# Patient Record
Sex: FEMALE | Race: WHITE | Employment: FULL TIME | URBAN - METROPOLITAN AREA
[De-identification: names, ages, dates, MRNs, and addresses within clinical notes are randomized per-mention and may not be internally consistent; named-entity substitution may affect disease eponyms.]

---

## 2017-01-17 ENCOUNTER — OFFICE VISIT (OUTPATIENT)
Dept: FAMILY MEDICINE CLINIC | Facility: CLINIC | Age: 47
End: 2017-01-17

## 2017-01-17 VITALS
HEART RATE: 71 BPM | OXYGEN SATURATION: 98 % | RESPIRATION RATE: 20 BRPM | HEIGHT: 62 IN | DIASTOLIC BLOOD PRESSURE: 86 MMHG | TEMPERATURE: 98 F | WEIGHT: 137 LBS | SYSTOLIC BLOOD PRESSURE: 126 MMHG | BODY MASS INDEX: 25.21 KG/M2

## 2017-01-17 DIAGNOSIS — G89.29 CHRONIC LEFT-SIDED LOW BACK PAIN WITHOUT SCIATICA: Primary | ICD-10-CM

## 2017-01-17 DIAGNOSIS — M54.50 CHRONIC LEFT-SIDED LOW BACK PAIN WITHOUT SCIATICA: Primary | ICD-10-CM

## 2017-01-17 PROCEDURE — 99213 OFFICE O/P EST LOW 20 MIN: CPT | Performed by: FAMILY MEDICINE

## 2017-01-18 NOTE — PATIENT INSTRUCTIONS
Treating Insomnia  Good sleeping habits are a key part of treatment. If needed, some medications may help you sleep better at first. Making healthy lifestyle changes and learning to relax can improve your sleep.  Treating insomnia takes commitment, but tr · Deep breathing. Sit or lie back in a chair. Take a slow, deep breath. Hold it for 5 counts. Then breathe out slowly through your mouth. Keep doing this until you feel relaxed. · Progressive muscle relaxation.  Tense and then relax the muscles in your bod Visualization  Visualization is like taking a mental vacation. It frees your mind while keeping your body in a calm state. To get started, picture yourself feeling warm and relaxed. Choose a peaceful setting that appeals to you and fill in the details.  If

## 2017-01-18 NOTE — PROGRESS NOTES
HPI:    Patient ID: Saira Campos is a 40year old female. Low Back Pain  This is a chronic problem. Episode onset: 1 yr ago. The problem occurs constantly. The problem is unchanged. The pain is present in the lumbar spine.  The quality of the pain is diagnosis)   Follow up if not resolving in 4-6 weeks. 1. Chronic left-sided low back pain without sciatica  - Physical Therapy Referral - External    No orders of the defined types were placed in this encounter.        Meds This Visit:  No prescriptions

## 2017-01-30 ENCOUNTER — MED REC SCAN ONLY (OUTPATIENT)
Dept: FAMILY MEDICINE CLINIC | Facility: CLINIC | Age: 47
End: 2017-01-30

## 2017-03-06 ENCOUNTER — TELEPHONE (OUTPATIENT)
Dept: SURGERY | Facility: CLINIC | Age: 47
End: 2017-03-06

## 2017-03-06 DIAGNOSIS — D43.2: Primary | ICD-10-CM

## 2017-03-07 ENCOUNTER — TELEPHONE (OUTPATIENT)
Dept: NEUROLOGY | Facility: CLINIC | Age: 47
End: 2017-03-07

## 2017-03-07 NOTE — TELEPHONE ENCOUNTER
Started PA [facility]IM online for MRI Brain cpt code 39088  Approved.  Order# [id number]  Valid 03/07/17 to 04/05/17    Pt not scheduled at this time for test. Contacted pt and LMOM advising of response to contact central scheduling to make appt

## 2017-03-19 ENCOUNTER — HOSPITAL ENCOUNTER (OUTPATIENT)
Dept: MRI IMAGING | Age: 47
Discharge: HOME | End: 2017-03-19
Attending: PHYSICIAN ASSISTANT
Payer: COMMERCIAL

## 2017-03-19 ENCOUNTER — APPOINTMENT (OUTPATIENT)
Dept: MRI IMAGING | Age: 47
End: 2017-03-19
Attending: NEUROLOGICAL SURGERY
Payer: COMMERCIAL

## 2017-03-19 DIAGNOSIS — M43.00 PARS LYSIS: ICD-10-CM

## 2017-03-19 DIAGNOSIS — M54.9 BACK PAIN, UNSPECIFIED BACK LOCATION, UNSPECIFIED BACK PAIN LATERALITY, UNSPECIFIED CHRONICITY: ICD-10-CM

## 2017-03-19 PROCEDURE — 72148 MRI LUMBAR SPINE W/O DYE: CPT

## 2017-04-01 ENCOUNTER — HOSPITAL ENCOUNTER (OUTPATIENT)
Dept: MRI IMAGING | Age: 47
Discharge: HOME | End: 2017-04-01
Attending: NEUROLOGICAL SURGERY
Payer: COMMERCIAL

## 2017-04-01 DIAGNOSIS — D43.2: ICD-10-CM

## 2017-04-01 PROCEDURE — A9575 INJ GADOTERATE MEGLUMI 0.1ML: HCPCS | Performed by: NEUROLOGICAL SURGERY

## 2017-04-01 PROCEDURE — 70553 MRI BRAIN STEM W/O & W/DYE: CPT

## 2017-04-04 ENCOUNTER — MED REC SCAN ONLY (OUTPATIENT)
Dept: FAMILY MEDICINE CLINIC | Facility: CLINIC | Age: 47
End: 2017-04-04

## 2017-04-13 ENCOUNTER — OFFICE VISIT (OUTPATIENT)
Dept: SURGERY | Facility: CLINIC | Age: 47
End: 2017-04-13

## 2017-04-13 VITALS
HEART RATE: 72 BPM | SYSTOLIC BLOOD PRESSURE: 104 MMHG | BODY MASS INDEX: 24.29 KG/M2 | WEIGHT: 132 LBS | DIASTOLIC BLOOD PRESSURE: 72 MMHG | RESPIRATION RATE: 12 BRPM | HEIGHT: 62 IN

## 2017-04-13 DIAGNOSIS — D43.2: Primary | ICD-10-CM

## 2017-04-13 PROCEDURE — 99212 OFFICE O/P EST SF 10 MIN: CPT | Performed by: PHYSICIAN ASSISTANT

## 2017-04-13 NOTE — PATIENT INSTRUCTIONS
Refill policies:    • Allow 2 business days for refills; controlled substances may take longer.   • Contact your pharmacy at least 5 days prior to running out of medication and have them send an electronic request or submit request through the “request re insurance carrier to obtain pre-certification or prior authorization. Unfortunately, [facility] has seen an increase in denial of payment even though the procedure/test has been pre-certified.   You are strongly encouraged to contact your insurance carrier to v

## 2017-04-13 NOTE — PROGRESS NOTES
Neurosurgery Clinic Visit  2017    Faizan Owens PCP:  DO ELENO Meza 1970 MRN [id number]       CC: f/u craniotomy    HPI:    Patient here for f/u after left parietal craniotomy for ganglioglioma resection on 4/15/2015 with Dr. Ct Miller. is alert and orientated x 3.  Speech fluent. Comprehension intact. Pupils equally round and reactive to light.  3+ brisk bilaterally.  EOMs intact.  Visual fields are full.  Face is symmetrical. Shrug shoulders normally bilaterally.  Cranial nerves II-XII a

## 2017-05-20 ENCOUNTER — HOSPITAL ENCOUNTER (OUTPATIENT)
Dept: MAMMOGRAPHY | Age: 47
Discharge: HOME | End: 2017-05-20
Attending: OBSTETRICS & GYNECOLOGY
Payer: COMMERCIAL

## 2017-05-20 DIAGNOSIS — Z12.31 VISIT FOR SCREENING MAMMOGRAM: ICD-10-CM

## 2017-05-20 PROCEDURE — 77067 SCR MAMMO BI INCL CAD: CPT | Performed by: OBSTETRICS & GYNECOLOGY

## 2017-06-09 ENCOUNTER — HOSPITAL ENCOUNTER (OUTPATIENT)
Dept: MAMMOGRAPHY | Facility: HOSPITAL | Age: 47
Discharge: HOME | End: 2017-06-09
Attending: OBSTETRICS & GYNECOLOGY
Payer: COMMERCIAL

## 2017-06-09 DIAGNOSIS — R92.2 INCONCLUSIVE MAMMOGRAM: ICD-10-CM

## 2017-06-09 PROCEDURE — 77061 BREAST TOMOSYNTHESIS UNI: CPT | Performed by: OBSTETRICS & GYNECOLOGY

## 2017-06-09 PROCEDURE — 76641 ULTRASOUND BREAST COMPLETE: CPT | Performed by: OBSTETRICS & GYNECOLOGY

## 2017-06-09 PROCEDURE — 77065 DX MAMMO INCL CAD UNI: CPT | Performed by: OBSTETRICS & GYNECOLOGY

## 2017-06-09 NOTE — IMAGING NOTE
Asssisted Dr. Patrice Amaral with recommendation for a Right breast biopsy for nodule. Emotional and educational support provided. Written information provided to [employer].  Our breast center schedulers will call pt within 72 hours to schedule an appointme

## 2017-06-16 ENCOUNTER — HOSPITAL ENCOUNTER (OUTPATIENT)
Dept: MAMMOGRAPHY | Age: 47
Discharge: HOME | End: 2017-06-16
Attending: OBSTETRICS & GYNECOLOGY
Payer: COMMERCIAL

## 2017-06-16 ENCOUNTER — HOSPITAL ENCOUNTER (OUTPATIENT)
Dept: ULTRASOUND IMAGING | Age: 47
Discharge: HOME | End: 2017-06-16
Attending: OBSTETRICS & GYNECOLOGY
Payer: COMMERCIAL

## 2017-06-16 DIAGNOSIS — N63.0 BREAST MASS: ICD-10-CM

## 2017-06-16 PROCEDURE — 77065 DX MAMMO INCL CAD UNI: CPT | Performed by: OBSTETRICS & GYNECOLOGY

## 2017-06-16 PROCEDURE — 19083 BX BREAST 1ST LESION US IMAG: CPT | Performed by: OBSTETRICS & GYNECOLOGY

## 2017-06-16 PROCEDURE — 88305 TISSUE EXAM BY PATHOLOGIST: CPT | Performed by: OBSTETRICS & GYNECOLOGY

## 2017-06-16 NOTE — PROCEDURES
[facility]  Procedure Note    Taylor Horowitz Patient Status:  Outpatient    1970 MRN [id number]   Location [facility] [place] Attending Ginny Clarke MD   Hosp Day # 0 PCP Vanesa Aguayo DO     Procedure: US guided bi

## 2017-06-20 ENCOUNTER — TELEPHONE (OUTPATIENT)
Dept: MAMMOGRAPHY | Facility: HOSPITAL | Age: 47
End: 2017-06-20

## 2017-06-20 NOTE — TELEPHONE ENCOUNTER
Telephoned patient regarding negative breast biopsy results. Biopsy site is healing well. Hematoma management discussed. Radiologist recommendation is to get a Diagnostic mammogram/ Ultrasound in 6 months for follow-up.  All questions answered and pt verb

## 2017-12-18 ENCOUNTER — HOSPITAL ENCOUNTER (OUTPATIENT)
Dept: MAMMOGRAPHY | Age: 47
Discharge: HOME | End: 2017-12-18
Attending: NURSE PRACTITIONER
Payer: COMMERCIAL

## 2017-12-18 ENCOUNTER — HOSPITAL ENCOUNTER (OUTPATIENT)
Dept: ULTRASOUND IMAGING | Age: 47
Discharge: HOME | End: 2017-12-18
Attending: NURSE PRACTITIONER
Payer: COMMERCIAL

## 2017-12-18 DIAGNOSIS — Z09 FOLLOW-UP EXAM, 3-6 MONTHS SINCE PREVIOUS EXAM: ICD-10-CM

## 2017-12-18 PROCEDURE — 76642 ULTRASOUND BREAST LIMITED: CPT | Performed by: NURSE PRACTITIONER

## 2017-12-18 PROCEDURE — 77065 DX MAMMO INCL CAD UNI: CPT | Performed by: NURSE PRACTITIONER

## 2017-12-18 PROCEDURE — 77061 BREAST TOMOSYNTHESIS UNI: CPT | Performed by: NURSE PRACTITIONER

## 2018-08-30 NOTE — PROGRESS NOTES
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 Symptoms: denies discharge, itching, burning or dysuria, periods are regular. Patient complains of needing form completed for work. 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 : yes. .   Exercise: once per week. Minimal exercise secondary to back pain.   Diet: watches calories closely     REVIEW OF SYSTEMS:   GENERAL: feels well otherwise  SKIN: denies any unusual skin lesions  EY PLATELET; Future  - IRON AND TIBC; Future  - FERRITIN; Future  - COMP METABOLIC PANEL (14); Future  - LIPID PANEL; Future  - TSH W REFLEX TO FREE T4; Future  - VITAMIN D, 25-HYDROXY; Future  - VITAMIN B12; Future    3.  Family history of colon cancer  Stool

## 2018-08-31 ENCOUNTER — OFFICE VISIT (OUTPATIENT)
Dept: FAMILY MEDICINE CLINIC | Facility: CLINIC | Age: 48
End: 2018-08-31
Payer: COMMERCIAL

## 2018-08-31 VITALS
SYSTOLIC BLOOD PRESSURE: 112 MMHG | BODY MASS INDEX: 22.82 KG/M2 | OXYGEN SATURATION: 99 % | RESPIRATION RATE: 16 BRPM | HEART RATE: 68 BPM | HEIGHT: 62 IN | WEIGHT: 124 LBS | DIASTOLIC BLOOD PRESSURE: 66 MMHG | TEMPERATURE: 99 F

## 2018-08-31 DIAGNOSIS — Z12.11 SCREENING FOR COLON CANCER: ICD-10-CM

## 2018-08-31 DIAGNOSIS — Z00.00 LABORATORY EXAM ORDERED AS PART OF ROUTINE GENERAL MEDICAL EXAMINATION: ICD-10-CM

## 2018-08-31 DIAGNOSIS — Z00.00 ROUTINE MEDICAL EXAM: Primary | ICD-10-CM

## 2018-08-31 DIAGNOSIS — Z80.0 FAMILY HISTORY OF COLON CANCER: ICD-10-CM

## 2018-08-31 PROCEDURE — 99396 PREV VISIT EST AGE 40-64: CPT | Performed by: PHYSICIAN ASSISTANT

## 2018-10-22 ENCOUNTER — APPOINTMENT (OUTPATIENT)
Dept: LAB | Facility: HOSPITAL | Age: 48
End: 2018-10-22
Attending: FAMILY MEDICINE
Payer: COMMERCIAL

## 2018-10-22 DIAGNOSIS — Z12.11 SCREENING FOR COLON CANCER: ICD-10-CM

## 2018-10-22 DIAGNOSIS — Z80.0 FAMILY HISTORY OF COLON CANCER: ICD-10-CM

## 2018-10-23 PROCEDURE — 82274 ASSAY TEST FOR BLOOD FECAL: CPT

## 2018-11-30 ENCOUNTER — OFFICE VISIT (OUTPATIENT)
Dept: FAMILY MEDICINE CLINIC | Facility: CLINIC | Age: 48
End: 2018-11-30
Payer: COMMERCIAL

## 2018-11-30 VITALS
SYSTOLIC BLOOD PRESSURE: 112 MMHG | HEIGHT: 62 IN | RESPIRATION RATE: 16 BRPM | OXYGEN SATURATION: 98 % | TEMPERATURE: 98 F | DIASTOLIC BLOOD PRESSURE: 72 MMHG | HEART RATE: 89 BPM | BODY MASS INDEX: 24.29 KG/M2 | WEIGHT: 132 LBS

## 2018-11-30 DIAGNOSIS — R59.0 LAD (LYMPHADENOPATHY) OF LEFT CERVICAL REGION: Primary | ICD-10-CM

## 2018-11-30 PROCEDURE — 99213 OFFICE O/P EST LOW 20 MIN: CPT | Performed by: FAMILY MEDICINE

## 2018-11-30 NOTE — PROGRESS NOTES
Pt here with left sided neck pain     It started a couple of months ago   Off and on  Started again a few days ago   OTC meds none   No  cough and congestion  No  sinus tendernss  No  ear pain  No  throat pain  No  fever and chills  No  sick contacts    RO

## 2018-12-01 ENCOUNTER — HOSPITAL ENCOUNTER (OUTPATIENT)
Dept: ULTRASOUND IMAGING | Age: 48
Discharge: HOME | End: 2018-12-01
Attending: FAMILY MEDICINE
Payer: COMMERCIAL

## 2018-12-01 DIAGNOSIS — R59.0 LAD (LYMPHADENOPATHY) OF LEFT CERVICAL REGION: ICD-10-CM

## 2018-12-01 PROCEDURE — 76536 US EXAM OF HEAD AND NECK: CPT | Performed by: FAMILY MEDICINE

## 2018-12-04 ENCOUNTER — LAB ENCOUNTER (OUTPATIENT)
Dept: LAB | Age: 48
End: 2018-12-04
Attending: FAMILY MEDICINE
Payer: COMMERCIAL

## 2018-12-04 DIAGNOSIS — Z00.00 LABORATORY EXAM ORDERED AS PART OF ROUTINE GENERAL MEDICAL EXAMINATION: ICD-10-CM

## 2018-12-04 PROCEDURE — 82306 VITAMIN D 25 HYDROXY: CPT | Performed by: PHYSICIAN ASSISTANT

## 2018-12-04 PROCEDURE — 83550 IRON BINDING TEST: CPT | Performed by: PHYSICIAN ASSISTANT

## 2018-12-04 PROCEDURE — 82728 ASSAY OF FERRITIN: CPT | Performed by: PHYSICIAN ASSISTANT

## 2018-12-04 PROCEDURE — 83540 ASSAY OF IRON: CPT | Performed by: PHYSICIAN ASSISTANT

## 2018-12-04 PROCEDURE — 36415 COLL VENOUS BLD VENIPUNCTURE: CPT | Performed by: PHYSICIAN ASSISTANT

## 2018-12-04 PROCEDURE — 82607 VITAMIN B-12: CPT | Performed by: PHYSICIAN ASSISTANT

## 2018-12-04 PROCEDURE — 80061 LIPID PANEL: CPT | Performed by: PHYSICIAN ASSISTANT

## 2018-12-04 PROCEDURE — 85007 BL SMEAR W/DIFF WBC COUNT: CPT | Performed by: PHYSICIAN ASSISTANT

## 2018-12-04 PROCEDURE — 80050 GENERAL HEALTH PANEL: CPT | Performed by: PHYSICIAN ASSISTANT

## 2018-12-06 ENCOUNTER — TELEPHONE (OUTPATIENT)
Dept: FAMILY MEDICINE CLINIC | Facility: CLINIC | Age: 48
End: 2018-12-06

## 2018-12-06 DIAGNOSIS — R79.89 ABNORMAL CBC: Primary | ICD-10-CM

## 2018-12-06 DIAGNOSIS — E78.00 ELEVATED CHOLESTEROL: ICD-10-CM

## 2018-12-06 NOTE — TELEPHONE ENCOUNTER
Informed pt of this update and pt expressed understanding and agreement. Cancelled lab order. Task completed.

## 2018-12-06 NOTE — TELEPHONE ENCOUNTER
Discussed with Dr Janis Carpio who is seeing patient on 12/19/18. Dr Janis Carpio states she will order any lab work that is needed. Please inform the patient that Dr Janis Carpio will order labs at her office visit.

## 2018-12-06 NOTE — TELEPHONE ENCOUNTER
----- Message from [facility] sent at 12/5/2018  7:37 PM CST -----  WBC elevated; also elevated in 2015. Has patient seen hematology ever? Lymphocytes are also elevated. Flow cytometry immunophenotyping is suggested-please order.  Also needs referr

## 2018-12-21 ENCOUNTER — OFFICE VISIT (OUTPATIENT)
Dept: HEMATOLOGY/ONCOLOGY | Facility: HOSPITAL | Age: 48
End: 2018-12-21
Attending: INTERNAL MEDICINE
Payer: COMMERCIAL

## 2018-12-21 VITALS
DIASTOLIC BLOOD PRESSURE: 77 MMHG | OXYGEN SATURATION: 97 % | RESPIRATION RATE: 18 BRPM | BODY MASS INDEX: 25 KG/M2 | SYSTOLIC BLOOD PRESSURE: 119 MMHG | HEART RATE: 71 BPM | TEMPERATURE: 98 F | WEIGHT: 138 LBS

## 2018-12-21 DIAGNOSIS — R79.89 ABNORMAL CBC: ICD-10-CM

## 2018-12-21 DIAGNOSIS — D72.820 LYMPHOCYTOSIS: Primary | ICD-10-CM

## 2018-12-21 PROCEDURE — 99243 OFF/OP CNSLTJ NEW/EST LOW 30: CPT | Performed by: INTERNAL MEDICINE

## 2018-12-21 RX ORDER — CHOLECALCIFEROL (VITAMIN D3) 125 MCG
CAPSULE ORAL
COMMUNITY

## 2018-12-21 NOTE — PROGRESS NOTES
Pt here for consult for abnormal CBC. WBC was 15. 9. Pt noted ongoing issues x6 months for a left swollen neck lymph node. Pt states it is painful when it flares up. Pt had a neck US on 12/1. Pt denies any recent infections. Pt has a hx of smoking.  Reviewed

## 2018-12-21 NOTE — CONSULTS
Cancer Center Report of Consultation    Patient Name: Praneeth Calles   YOB: 1970   Medical Record Number: [id number]   CSN: [id number]   Consulting Physician: Akshat De Leon MD  Referring Physician(s): Paul Sheppard  Date of Consultation: Heart Disease Father    • Other (CAD) Father    • Other (CHF) Father    • Heart Disease Paternal Grandfather    • Heart Disease Maternal Grandfather    • Other (stroke) Maternal Grandmother    • Hypertension Brother    • High Cholesterol Brother        Psy and oriented x 3, not in acute distress. HEENT: EOMs intact. PERRL. Oropharynx is clear. Neck: No JVD. No palpable lymphadenopathy. Neck is supple. Lymphatics: There is no palpable lymphadenopathy  in the cervical, axillary, or inguinal regions.   Chest

## 2018-12-27 ENCOUNTER — TELEPHONE (OUTPATIENT)
Dept: HEMATOLOGY/ONCOLOGY | Facility: HOSPITAL | Age: 48
End: 2018-12-27

## 2018-12-27 NOTE — TELEPHONE ENCOUNTER
Odette Anderson MD   You 2 minutes ago (10:12 AM)         Please tell her I'll call tomorrow         Left VM that MD will call her tomorrow.

## 2018-12-27 NOTE — TELEPHONE ENCOUNTER
Pt called stating she got the leukemia test results, but wants to review them with someone. Anything specific I should tell her?

## 2018-12-28 ENCOUNTER — TELEPHONE (OUTPATIENT)
Dept: HEMATOLOGY/ONCOLOGY | Facility: HOSPITAL | Age: 48
End: 2018-12-28

## 2018-12-28 ENCOUNTER — TELEPHONE (OUTPATIENT)
Dept: FAMILY MEDICINE CLINIC | Facility: CLINIC | Age: 48
End: 2018-12-28

## 2018-12-28 NOTE — TELEPHONE ENCOUNTER
Patient is calling requesting an order for her yearly mammogram.  Patient was seen in August for her yearly physical.    Please advise.

## 2018-12-28 NOTE — TELEPHONE ENCOUNTER
Pt does see gyne and will have Mammo ordered from [address] I will SWITCH the dose or number of times a day I take the medications listed below when I get home from the hospital:  None

## 2018-12-28 NOTE — TELEPHONE ENCOUNTER
Spoke to pt, new CLL diag. Jay CT and lab tests and f/u with me. Will refer to [facility] also given young age. No indication for Rx at this time.

## 2018-12-28 NOTE — TELEPHONE ENCOUNTER
Patient had annual physical exam on 08/31/18 with AV   She is now requesting order for mammogram   Please advise    Thank you.

## 2019-01-02 ENCOUNTER — APPOINTMENT (OUTPATIENT)
Dept: LAB | Age: 49
End: 2019-01-02
Attending: INTERNAL MEDICINE
Payer: COMMERCIAL

## 2019-01-03 ENCOUNTER — LAB ENCOUNTER (OUTPATIENT)
Dept: LAB | Facility: HOSPITAL | Age: 49
End: 2019-01-03
Attending: INTERNAL MEDICINE
Payer: COMMERCIAL

## 2019-01-03 DIAGNOSIS — C91.10 CLL (CHRONIC LYMPHOCYTIC LEUKEMIA) (HCC): ICD-10-CM

## 2019-01-03 PROCEDURE — 88271 CYTOGENETICS DNA PROBE: CPT

## 2019-01-03 PROCEDURE — 88185 FLOWCYTOMETRY/TC ADD-ON: CPT

## 2019-01-03 PROCEDURE — 81263 IGH VARI REGIONAL MUTATION: CPT

## 2019-01-03 PROCEDURE — 88275 CYTOGENETICS 100-300: CPT

## 2019-01-03 PROCEDURE — 88184 FLOWCYTOMETRY/ TC 1 MARKER: CPT

## 2019-01-04 ENCOUNTER — HOSPITAL ENCOUNTER (OUTPATIENT)
Dept: CT IMAGING | Age: 49
Discharge: HOME | End: 2019-01-04
Attending: INTERNAL MEDICINE
Payer: COMMERCIAL

## 2019-01-04 DIAGNOSIS — C91.10 CLL (CHRONIC LYMPHOCYTIC LEUKEMIA) (HCC): ICD-10-CM

## 2019-01-04 LAB — CREAT SERPL-MCNC: 0.9 MG/DL (ref 0.55–1.02)

## 2019-01-04 PROCEDURE — 74177 CT ABD & PELVIS W/CONTRAST: CPT | Performed by: INTERNAL MEDICINE

## 2019-01-04 PROCEDURE — 71260 CT THORAX DX C+: CPT | Performed by: INTERNAL MEDICINE

## 2019-01-04 PROCEDURE — 82565 ASSAY OF CREATININE: CPT

## 2019-01-11 ENCOUNTER — OFFICE VISIT (OUTPATIENT)
Dept: HEMATOLOGY/ONCOLOGY | Facility: HOSPITAL | Age: 49
End: 2019-01-11
Attending: INTERNAL MEDICINE
Payer: COMMERCIAL

## 2019-01-11 VITALS
HEART RATE: 68 BPM | OXYGEN SATURATION: 97 % | RESPIRATION RATE: 18 BRPM | WEIGHT: 136.5 LBS | DIASTOLIC BLOOD PRESSURE: 71 MMHG | TEMPERATURE: 98 F | BODY MASS INDEX: 25 KG/M2 | SYSTOLIC BLOOD PRESSURE: 125 MMHG

## 2019-01-11 DIAGNOSIS — C91.10 CLL (CHRONIC LYMPHOCYTIC LEUKEMIA) (HCC): Primary | ICD-10-CM

## 2019-01-11 PROCEDURE — 99214 OFFICE O/P EST MOD 30 MIN: CPT | Performed by: INTERNAL MEDICINE

## 2019-01-11 NOTE — PROGRESS NOTES
MD f/u for CLL to discus POC. Saw Dr. Markell Albright at [facility].      Outpatient Oncology Care Plan  Problem list:  knowledge deficit    Problems related to:    disease/disease progression    Interventions:  provided general teaching    Expected outcomes

## 2019-01-11 NOTE — PROGRESS NOTES
[facility] Progress Note      Patient Name:  Jannette Ashley  YOB: 1970  Medical Record Number:  [id number]    Date of visit:  1/11/2019    CHIEF COMPLAINT: Newly diag CLL.      HPI:     53year old that I initially saw on 12/21/18 Rhythm, no murmurs. Lungs: Clear to auscultation and percussion. Abdomen: Soft, non tender, no hepato-splenomegaly. Extremities:  No edema. CNS: no focal deficit    Emotional well being: Patient's emotional well being was assessed.   No issues requirin

## 2019-01-17 PROBLEM — B00.9 HERPES: Status: ACTIVE | Noted: 2019-01-17

## 2019-01-17 PROBLEM — Z98.890 HISTORY OF CRYOSURGERY: Status: ACTIVE | Noted: 2019-01-17

## 2019-01-17 PROBLEM — Z30.430 ENCOUNTER FOR INSERTION OF MIRENA IUD: Status: ACTIVE | Noted: 2019-01-17

## 2019-01-17 PROBLEM — Z80.3 FAMILY HISTORY OF BREAST CANCER: Status: ACTIVE | Noted: 2019-01-17

## 2019-02-11 ENCOUNTER — HOSPITAL ENCOUNTER (OUTPATIENT)
Dept: MAMMOGRAPHY | Age: 49
Discharge: HOME | End: 2019-02-11
Attending: NURSE PRACTITIONER
Payer: COMMERCIAL

## 2019-02-11 DIAGNOSIS — Z12.31 BREAST CANCER SCREENING BY MAMMOGRAM: ICD-10-CM

## 2019-02-11 PROCEDURE — 77063 BREAST TOMOSYNTHESIS BI: CPT | Performed by: NURSE PRACTITIONER

## 2019-02-11 PROCEDURE — 77067 SCR MAMMO BI INCL CAD: CPT | Performed by: NURSE PRACTITIONER

## 2019-03-15 ENCOUNTER — APPOINTMENT (OUTPATIENT)
Dept: LAB | Age: 49
End: 2019-03-15
Attending: PHYSICIAN ASSISTANT
Payer: COMMERCIAL

## 2019-03-15 DIAGNOSIS — E78.00 ELEVATED CHOLESTEROL: ICD-10-CM

## 2019-03-15 LAB
CHOLEST SMN-MCNC: 237 MG/DL (ref ?–200)
HDLC SERPL-MCNC: 51 MG/DL (ref 40–59)
LDLC SERPL CALC-MCNC: 161 MG/DL (ref ?–100)
NONHDLC SERPL-MCNC: 186 MG/DL (ref ?–130)
TRIGL SERPL-MCNC: 124 MG/DL (ref 30–149)
VLDLC SERPL CALC-MCNC: 25 MG/DL (ref 0–30)

## 2019-03-15 PROCEDURE — 36415 COLL VENOUS BLD VENIPUNCTURE: CPT | Performed by: PHYSICIAN ASSISTANT

## 2019-03-15 PROCEDURE — 80061 LIPID PANEL: CPT | Performed by: PHYSICIAN ASSISTANT

## 2019-04-12 ENCOUNTER — LAB ENCOUNTER (OUTPATIENT)
Dept: LAB | Age: 49
End: 2019-04-12
Attending: INTERNAL MEDICINE
Payer: COMMERCIAL

## 2019-04-12 DIAGNOSIS — C91.10 CLL (CHRONIC LYMPHOCYTIC LEUKEMIA) (HCC): ICD-10-CM

## 2019-04-12 PROCEDURE — 80053 COMPREHEN METABOLIC PANEL: CPT

## 2019-04-12 PROCEDURE — 36415 COLL VENOUS BLD VENIPUNCTURE: CPT

## 2019-04-12 PROCEDURE — 85025 COMPLETE CBC W/AUTO DIFF WBC: CPT

## 2019-04-12 PROCEDURE — 82784 ASSAY IGA/IGD/IGG/IGM EACH: CPT

## 2019-04-12 PROCEDURE — 83615 LACTATE (LD) (LDH) ENZYME: CPT

## 2019-04-15 ENCOUNTER — TELEPHONE (OUTPATIENT)
Dept: SURGERY | Facility: CLINIC | Age: 49
End: 2019-04-15

## 2019-04-15 DIAGNOSIS — D43.2 GANGLIOGLIOMA OF BRAIN (HCC): ICD-10-CM

## 2019-04-15 DIAGNOSIS — Z98.890 HX OF CRANIOTOMY: Primary | ICD-10-CM

## 2019-04-15 NOTE — TELEPHONE ENCOUNTER
LMTCB. Per RINA Davies: \"MRI brain ordered. Patient should schedule. Once completed, patient should follow up after Neuro Conference on a Tuesday to review imaging with Dr. Lance Blizzard. Thank you, please advise.   \"

## 2019-04-20 NOTE — PROGRESS NOTES
HPI:   Lidya Hoang is a 53year old female who presents to discuss labs, CLL diagnosis     Mom has elevated cholesterol   Dad with first MI at 44 and several thereafter  No regular exercise due to back pain   Diet ok   Patient denies chest pain, shawn consistent with early degenerative change. Trace annular bulge. No significant abnormality. There is no spinal stenosis or root impingement. L4-L5:  No significant disc/facet abnormality, spinal stenosis, or foraminal narrowing.   L5-S1:  No significant di NEURO N/A 4/15/2015    Performed by Castillo Graham MD at [address] [facility]   • NEEDLE BIOPSY RIGHT Right 06/2017    BENIGN FIBROADENOMA   • SACROILIAC JOINT INJECTION Left 5/10/2017    Performed by Yolanda Pierre MD at [address] [place] [address] well developed, well nourished,in no apparent distress  CARDIO: RRR without murmur  LUNGS: clear to auscultation  NECK: supple,no adenopathy,no thyromegaly  HEENT: atraumatic, normocephalic,ears and throat are clear  EYES:PERRLA, EOMI, normal,conjunctiva a

## 2019-04-22 ENCOUNTER — OFFICE VISIT (OUTPATIENT)
Dept: FAMILY MEDICINE CLINIC | Facility: CLINIC | Age: 49
End: 2019-04-22
Payer: COMMERCIAL

## 2019-04-22 VITALS
HEART RATE: 57 BPM | SYSTOLIC BLOOD PRESSURE: 122 MMHG | DIASTOLIC BLOOD PRESSURE: 78 MMHG | HEIGHT: 62 IN | RESPIRATION RATE: 18 BRPM | WEIGHT: 135.5 LBS | OXYGEN SATURATION: 99 % | TEMPERATURE: 99 F | BODY MASS INDEX: 24.93 KG/M2

## 2019-04-22 DIAGNOSIS — C91.10 CLL (CHRONIC LYMPHOCYTIC LEUKEMIA) (HCC): ICD-10-CM

## 2019-04-22 DIAGNOSIS — E78.00 ELEVATED CHOLESTEROL: ICD-10-CM

## 2019-04-22 DIAGNOSIS — Z82.49 FAMILY HISTORY OF EARLY CAD: Primary | ICD-10-CM

## 2019-04-22 DIAGNOSIS — Z80.0 FAMILY HISTORY OF COLON CANCER: ICD-10-CM

## 2019-04-22 PROCEDURE — 90732 PPSV23 VACC 2 YRS+ SUBQ/IM: CPT | Performed by: FAMILY MEDICINE

## 2019-04-22 PROCEDURE — 99214 OFFICE O/P EST MOD 30 MIN: CPT | Performed by: FAMILY MEDICINE

## 2019-04-22 PROCEDURE — 90471 IMMUNIZATION ADMIN: CPT | Performed by: FAMILY MEDICINE

## 2019-04-22 RX ORDER — ROSUVASTATIN CALCIUM 5 MG/1
5 TABLET, COATED ORAL NIGHTLY
Qty: 90 TABLET | Refills: 0 | Status: SHIPPED | OUTPATIENT
Start: 2019-04-22 | End: 2019-08-02

## 2019-04-24 ENCOUNTER — HOSPITAL ENCOUNTER (OUTPATIENT)
Dept: MRI IMAGING | Age: 49
Discharge: HOME | End: 2019-04-24
Attending: PHYSICIAN ASSISTANT
Payer: COMMERCIAL

## 2019-04-24 DIAGNOSIS — D43.2 GANGLIOGLIOMA OF BRAIN (HCC): ICD-10-CM

## 2019-04-24 DIAGNOSIS — Z98.890 HX OF CRANIOTOMY: ICD-10-CM

## 2019-04-24 PROCEDURE — 70553 MRI BRAIN STEM W/O & W/DYE: CPT | Performed by: PHYSICIAN ASSISTANT

## 2019-04-24 PROCEDURE — A9575 INJ GADOTERATE MEGLUMI 0.1ML: HCPCS | Performed by: PHYSICIAN ASSISTANT

## 2019-04-26 NOTE — TELEPHONE ENCOUNTER
Left message for patient that we need to schedule next available appointment in oncology clinic for follow up to review imaging.

## 2019-05-07 ENCOUNTER — NURSE ONLY (OUTPATIENT)
Dept: HEMATOLOGY/ONCOLOGY | Facility: HOSPITAL | Age: 49
End: 2019-05-07
Attending: NEUROLOGICAL SURGERY
Payer: COMMERCIAL

## 2019-05-07 ENCOUNTER — OFFICE VISIT (OUTPATIENT)
Dept: NEUROLOGY | Facility: CLINIC | Age: 49
End: 2019-05-07
Payer: COMMERCIAL

## 2019-05-07 VITALS
OXYGEN SATURATION: 98 % | HEART RATE: 76 BPM | WEIGHT: 137.38 LBS | RESPIRATION RATE: 18 BRPM | TEMPERATURE: 97 F | SYSTOLIC BLOOD PRESSURE: 144 MMHG | HEIGHT: 62 IN | DIASTOLIC BLOOD PRESSURE: 83 MMHG | BODY MASS INDEX: 25.28 KG/M2

## 2019-05-07 DIAGNOSIS — D43.2: Primary | ICD-10-CM

## 2019-05-07 DIAGNOSIS — Z98.890 HX OF CRANIOTOMY: ICD-10-CM

## 2019-05-07 DIAGNOSIS — G89.29 CHRONIC LEFT-SIDED LOW BACK PAIN WITHOUT SCIATICA: ICD-10-CM

## 2019-05-07 DIAGNOSIS — M54.50 CHRONIC LEFT-SIDED LOW BACK PAIN WITHOUT SCIATICA: ICD-10-CM

## 2019-05-07 PROCEDURE — 99213 OFFICE O/P EST LOW 20 MIN: CPT | Performed by: PHYSICIAN ASSISTANT

## 2019-05-07 PROCEDURE — 99211 OFF/OP EST MAY X REQ PHY/QHP: CPT

## 2019-05-07 NOTE — PROGRESS NOTES
Neurosurgery Clinic Visit  2019    Donn Wilkinson PCP:  Katlyn Raza DO    1970 MRN [id number]           REASON FOR VISIT: Hx of craniotomy for ganglioglioma resection, 4/15/15 with Dr. Wilda Mckeon, imaging review Neuro Conference      HISTORY OF P HPI.      PHYSICAL EXAMINATION:  There were no vitals taken for this visit. GENERAL:  Very pleasant patient is in no acute distress. HEENT:  Normocephalic, atraumatic. RESPIRATORY: Easy and even  NEUROLOGICAL:  This patient is alert and orientated x 3. Dictated by: Karlene Khan MD on 4/24/2019 at 15:55       Approved by: Karlene Khan MD      ASSESSMENT and PLAN:  (D43.2) [facility]x of Intrac[facility]li[facility]  (primary encounter diagnosis)  Comment: S/p resection 4/2015    (Z98.890) Hx of c

## 2019-07-19 ENCOUNTER — LAB ENCOUNTER (OUTPATIENT)
Dept: LAB | Age: 49
End: 2019-07-19
Attending: INTERNAL MEDICINE
Payer: COMMERCIAL

## 2019-07-19 ENCOUNTER — NURSE ONLY (OUTPATIENT)
Dept: FAMILY MEDICINE CLINIC | Facility: CLINIC | Age: 49
End: 2019-07-19
Payer: COMMERCIAL

## 2019-07-19 DIAGNOSIS — C91.10 CLL (CHRONIC LYMPHOCYTIC LEUKEMIA) (HCC): ICD-10-CM

## 2019-07-19 LAB
ALBUMIN SERPL-MCNC: 3.8 G/DL (ref 3.4–5)
ALBUMIN/GLOB SERPL: 1 {RATIO} (ref 1–2)
ALP LIVER SERPL-CCNC: 65 U/L (ref 39–100)
ALT SERPL-CCNC: 30 U/L (ref 13–56)
ANION GAP SERPL CALC-SCNC: 4 MMOL/L (ref 0–18)
AST SERPL-CCNC: 14 U/L (ref 15–37)
BASOPHILS # BLD AUTO: 0.05 X10(3) UL (ref 0–0.2)
BASOPHILS NFR BLD AUTO: 0.3 %
BILIRUB SERPL-MCNC: 0.5 MG/DL (ref 0.1–2)
BUN BLD-MCNC: 12 MG/DL (ref 7–18)
BUN/CREAT SERPL: 13 (ref 10–20)
CALCIUM BLD-MCNC: 10 MG/DL (ref 8.5–10.1)
CHLORIDE SERPL-SCNC: 107 MMOL/L (ref 98–112)
CO2 SERPL-SCNC: 29 MMOL/L (ref 21–32)
CREAT BLD-MCNC: 0.92 MG/DL (ref 0.55–1.02)
DEPRECATED RDW RBC AUTO: 38.5 FL (ref 35.1–46.3)
EOSINOPHIL # BLD AUTO: 0.15 X10(3) UL (ref 0–0.7)
EOSINOPHIL NFR BLD AUTO: 1 %
ERYTHROCYTE [DISTWIDTH] IN BLOOD BY AUTOMATED COUNT: 11.8 % (ref 11–15)
GLOBULIN PLAS-MCNC: 3.7 G/DL (ref 2.8–4.4)
GLUCOSE BLD-MCNC: 83 MG/DL (ref 70–99)
HCT VFR BLD AUTO: 42.3 % (ref 35–48)
HGB BLD-MCNC: 14 G/DL (ref 12–16)
IGA SERPL-MCNC: 131 MG/DL (ref 70–312)
IGM SERPL-MCNC: 54.9 MG/DL (ref 43–279)
IMM GRANULOCYTES # BLD AUTO: 0.04 X10(3) UL (ref 0–1)
IMM GRANULOCYTES NFR BLD: 0.3 %
IMMUNOGLOBULIN PNL SER-MCNC: 939 MG/DL (ref 791–1643)
LDH SERPL L TO P-CCNC: 193 U/L (ref 84–246)
LYMPHOCYTES # BLD AUTO: 10.54 X10(3) UL (ref 1–4)
LYMPHOCYTES NFR BLD AUTO: 69.4 %
M PROTEIN MFR SERPL ELPH: 7.5 G/DL (ref 6.4–8.2)
MCH RBC QN AUTO: 29.7 PG (ref 26–34)
MCHC RBC AUTO-ENTMCNC: 33.1 G/DL (ref 31–37)
MCV RBC AUTO: 89.6 FL (ref 80–100)
MONOCYTES # BLD AUTO: 0.68 X10(3) UL (ref 0.1–1)
MONOCYTES NFR BLD AUTO: 4.5 %
NEUTROPHILS # BLD AUTO: 3.73 X10 (3) UL (ref 1.5–7.7)
NEUTROPHILS # BLD AUTO: 3.73 X10(3) UL (ref 1.5–7.7)
NEUTROPHILS NFR BLD AUTO: 24.5 %
OSMOLALITY SERPL CALC.SUM OF ELEC: 289 MOSM/KG (ref 275–295)
PLATELET # BLD AUTO: 177 10(3)UL (ref 150–450)
POTASSIUM SERPL-SCNC: 4.3 MMOL/L (ref 3.5–5.1)
RBC # BLD AUTO: 4.72 X10(6)UL (ref 3.8–5.3)
SODIUM SERPL-SCNC: 140 MMOL/L (ref 136–145)
WBC # BLD AUTO: 15.2 X10(3) UL (ref 4–11)

## 2019-07-19 PROCEDURE — 82784 ASSAY IGA/IGD/IGG/IGM EACH: CPT

## 2019-07-19 PROCEDURE — 83615 LACTATE (LD) (LDH) ENZYME: CPT

## 2019-07-19 PROCEDURE — 85025 COMPLETE CBC W/AUTO DIFF WBC: CPT

## 2019-07-19 PROCEDURE — 90471 IMMUNIZATION ADMIN: CPT | Performed by: FAMILY MEDICINE

## 2019-07-19 PROCEDURE — 80053 COMPREHEN METABOLIC PANEL: CPT

## 2019-07-19 PROCEDURE — 36415 COLL VENOUS BLD VENIPUNCTURE: CPT

## 2019-07-19 PROCEDURE — 90670 PCV13 VACCINE IM: CPT | Performed by: FAMILY MEDICINE

## 2019-08-02 DIAGNOSIS — Z82.49 FAMILY HISTORY OF EARLY CAD: ICD-10-CM

## 2019-08-02 DIAGNOSIS — E78.00 ELEVATED CHOLESTEROL: ICD-10-CM

## 2019-08-03 RX ORDER — ROSUVASTATIN CALCIUM 5 MG/1
TABLET, COATED ORAL
Qty: 90 TABLET | Refills: 0 | Status: SHIPPED | OUTPATIENT
Start: 2019-08-03 | End: 2019-10-30

## 2019-10-10 DIAGNOSIS — E78.00 ELEVATED CHOLESTEROL: ICD-10-CM

## 2019-10-10 DIAGNOSIS — Z82.49 FAMILY HISTORY OF EARLY CAD: ICD-10-CM

## 2019-10-10 RX ORDER — ROSUVASTATIN CALCIUM 5 MG/1
TABLET, COATED ORAL
Qty: 90 TABLET | Refills: 0 | Status: SHIPPED | OUTPATIENT
Start: 2019-10-10 | End: 2019-10-25

## 2019-10-24 ENCOUNTER — LAB ENCOUNTER (OUTPATIENT)
Dept: LAB | Age: 49
End: 2019-10-24
Attending: INTERNAL MEDICINE
Payer: COMMERCIAL

## 2019-10-24 DIAGNOSIS — E78.00 ELEVATED CHOLESTEROL: ICD-10-CM

## 2019-10-24 DIAGNOSIS — Z82.49 FAMILY HISTORY OF EARLY CAD: ICD-10-CM

## 2019-10-24 DIAGNOSIS — C91.10 CLL (CHRONIC LYMPHOCYTIC LEUKEMIA) (HCC): ICD-10-CM

## 2019-10-24 PROCEDURE — 80061 LIPID PANEL: CPT | Performed by: FAMILY MEDICINE

## 2019-10-24 NOTE — PROGRESS NOTES
[facility] Progress Note      Patient Name:  Nathaniel Hidalgo  YOB: 1970  Medical Record Number:  [id number]    Date of visit:  10/25/2019    CHIEF COMPLAINT: Stage 0 CLL.      HPI:     53year old that I initially saw on 12/21/18 af oriented x 3, not in acute distress. HEENT: ZINA, oropharynx  clear. Neck: supple. Intimal fullness left anterior triangle, no discretely enlarged lymph nodes. CVS: S1S2, regular  Rhythm, no murmurs. Lungs: Clear to auscultation and percussion.   A

## 2019-10-25 ENCOUNTER — OFFICE VISIT (OUTPATIENT)
Dept: HEMATOLOGY/ONCOLOGY | Facility: HOSPITAL | Age: 49
End: 2019-10-25
Attending: INTERNAL MEDICINE
Payer: COMMERCIAL

## 2019-10-25 VITALS
WEIGHT: 142.81 LBS | OXYGEN SATURATION: 96 % | TEMPERATURE: 98 F | SYSTOLIC BLOOD PRESSURE: 126 MMHG | DIASTOLIC BLOOD PRESSURE: 82 MMHG | HEART RATE: 85 BPM | HEIGHT: 62.01 IN | BODY MASS INDEX: 25.95 KG/M2 | RESPIRATION RATE: 16 BRPM

## 2019-10-25 DIAGNOSIS — C91.10 CLL (CHRONIC LYMPHOCYTIC LEUKEMIA) (HCC): ICD-10-CM

## 2019-10-25 PROCEDURE — 99213 OFFICE O/P EST LOW 20 MIN: CPT | Performed by: INTERNAL MEDICINE

## 2019-10-25 NOTE — PROGRESS NOTES
MD f/u for CLL. Labs drawn yesterday. Pt reports that she has been feeling good. Does report that 1 day last week she went home from work dizzy, but was resolved by the next day.      Education Record    Learner:  Patient    Barriers / Limitations:  None

## 2019-10-30 ENCOUNTER — OFFICE VISIT (OUTPATIENT)
Dept: FAMILY MEDICINE CLINIC | Facility: CLINIC | Age: 49
End: 2019-10-30
Payer: COMMERCIAL

## 2019-10-30 ENCOUNTER — HOSPITAL ENCOUNTER (OUTPATIENT)
Dept: GENERAL RADIOLOGY | Age: 49
Discharge: HOME | End: 2019-10-30
Attending: FAMILY MEDICINE
Payer: COMMERCIAL

## 2019-10-30 VITALS
OXYGEN SATURATION: 98 % | SYSTOLIC BLOOD PRESSURE: 112 MMHG | HEIGHT: 62 IN | BODY MASS INDEX: 26.13 KG/M2 | HEART RATE: 78 BPM | WEIGHT: 142 LBS | RESPIRATION RATE: 18 BRPM | DIASTOLIC BLOOD PRESSURE: 76 MMHG

## 2019-10-30 DIAGNOSIS — S69.91XA INJURY OF RIGHT WRIST, INITIAL ENCOUNTER: Primary | ICD-10-CM

## 2019-10-30 DIAGNOSIS — Z23 NEED FOR VACCINATION: ICD-10-CM

## 2019-10-30 DIAGNOSIS — E78.00 ELEVATED CHOLESTEROL: ICD-10-CM

## 2019-10-30 DIAGNOSIS — S69.91XA INJURY OF RIGHT WRIST, INITIAL ENCOUNTER: ICD-10-CM

## 2019-10-30 DIAGNOSIS — Z82.49 FAMILY HISTORY OF EARLY CAD: ICD-10-CM

## 2019-10-30 PROCEDURE — 90471 IMMUNIZATION ADMIN: CPT | Performed by: FAMILY MEDICINE

## 2019-10-30 PROCEDURE — 99214 OFFICE O/P EST MOD 30 MIN: CPT | Performed by: FAMILY MEDICINE

## 2019-10-30 PROCEDURE — 73110 X-RAY EXAM OF WRIST: CPT | Performed by: FAMILY MEDICINE

## 2019-10-30 PROCEDURE — 90686 IIV4 VACC NO PRSV 0.5 ML IM: CPT | Performed by: FAMILY MEDICINE

## 2019-10-30 RX ORDER — ROSUVASTATIN CALCIUM 5 MG/1
TABLET, COATED ORAL
Qty: 90 TABLET | Refills: 1 | Status: SHIPPED | OUTPATIENT
Start: 2019-10-30 | End: 2019-12-30

## 2019-10-30 NOTE — PROGRESS NOTES
HPI:    Patient ID: Pam Castillo is a 53year old female. Pts last blood work showed high cholesterol, but it has decreased from past blood work.  She notes she has become more consistent with her medication and has been engaging in more aerobic exercis Cardiovascular: Normal rate, regular rhythm and normal heart sounds. Pulmonary/Chest: Effort normal and breath sounds normal. No respiratory distress. She has no wheezes.  Musculoskeletal:      Comments: Mild tenderness at ulnar wrist  Full ROM     Skin

## 2019-11-08 ENCOUNTER — HOSPITAL ENCOUNTER (OUTPATIENT)
Dept: CV DIAGNOSTICS | Age: 49
Discharge: HOME | End: 2019-11-08
Attending: FAMILY MEDICINE
Payer: COMMERCIAL

## 2019-11-08 ENCOUNTER — APPOINTMENT (OUTPATIENT)
Dept: LAB | Age: 49
End: 2019-11-08
Attending: FAMILY MEDICINE
Payer: COMMERCIAL

## 2019-11-08 DIAGNOSIS — E78.00 ELEVATED CHOLESTEROL: ICD-10-CM

## 2019-11-08 DIAGNOSIS — Z82.49 FAMILY HISTORY OF EARLY CAD: ICD-10-CM

## 2019-11-08 PROCEDURE — 93017 CV STRESS TEST TRACING ONLY: CPT | Performed by: FAMILY MEDICINE

## 2019-11-08 PROCEDURE — 93005 ELECTROCARDIOGRAM TRACING: CPT

## 2019-11-08 PROCEDURE — 93018 CV STRESS TEST I&R ONLY: CPT | Performed by: FAMILY MEDICINE

## 2019-11-08 PROCEDURE — 93010 ELECTROCARDIOGRAM REPORT: CPT | Performed by: INTERNAL MEDICINE

## 2019-11-21 ENCOUNTER — TELEPHONE (OUTPATIENT)
Dept: CARDIOLOGY | Age: 49
End: 2019-11-21

## 2019-11-21 ENCOUNTER — OFFICE VISIT (OUTPATIENT)
Dept: CARDIOLOGY | Age: 49
End: 2019-11-21

## 2019-11-21 VITALS
HEART RATE: 88 BPM | HEIGHT: 62 IN | SYSTOLIC BLOOD PRESSURE: 102 MMHG | WEIGHT: 145 LBS | BODY MASS INDEX: 26.68 KG/M2 | DIASTOLIC BLOOD PRESSURE: 70 MMHG

## 2019-11-21 DIAGNOSIS — E78.2 MIXED HYPERLIPIDEMIA: ICD-10-CM

## 2019-11-21 DIAGNOSIS — Z82.49 FAMILY HISTORY OF EARLY CAD: ICD-10-CM

## 2019-11-21 DIAGNOSIS — R94.39 ABNORMAL STRESS TEST: Primary | ICD-10-CM

## 2019-11-21 PROCEDURE — 99204 OFFICE O/P NEW MOD 45 MIN: CPT | Performed by: INTERNAL MEDICINE

## 2019-11-21 RX ORDER — ROSUVASTATIN CALCIUM 5 MG/1
TABLET, COATED ORAL
COMMUNITY
Start: 2019-10-30

## 2019-11-21 RX ORDER — METOPROLOL TARTRATE 100 MG/1
100 TABLET ORAL 2 TIMES DAILY
Qty: 2 TABLET | Refills: 0 | Status: SHIPPED | OUTPATIENT
Start: 2019-11-21

## 2019-11-21 RX ORDER — AMOXICILLIN 500 MG
1200 CAPSULE ORAL 2 TIMES DAILY
COMMUNITY

## 2019-11-21 RX ORDER — CHOLECALCIFEROL (VITAMIN D3) 125 MCG
CAPSULE ORAL
COMMUNITY

## 2019-11-21 SDOH — HEALTH STABILITY: MENTAL HEALTH: HOW OFTEN DO YOU HAVE A DRINK CONTAINING ALCOHOL?: NOT ASKED

## 2019-11-21 ASSESSMENT — PATIENT HEALTH QUESTIONNAIRE - PHQ9
SUM OF ALL RESPONSES TO PHQ9 QUESTIONS 1 AND 2: 0
1. LITTLE INTEREST OR PLEASURE IN DOING THINGS: NOT AT ALL
2. FEELING DOWN, DEPRESSED OR HOPELESS: NOT AT ALL
SUM OF ALL RESPONSES TO PHQ9 QUESTIONS 1 AND 2: 0

## 2019-12-02 ENCOUNTER — HOSPITAL ENCOUNTER (OUTPATIENT)
Dept: CT IMAGING | Facility: HOSPITAL | Age: 49
Discharge: HOME | End: 2019-12-02
Attending: INTERNAL MEDICINE
Payer: COMMERCIAL

## 2019-12-02 VITALS — SYSTOLIC BLOOD PRESSURE: 104 MMHG | HEART RATE: 61 BPM | DIASTOLIC BLOOD PRESSURE: 65 MMHG

## 2019-12-02 DIAGNOSIS — R94.39 ABNORMAL STRESS TEST: ICD-10-CM

## 2019-12-02 DIAGNOSIS — Z82.49 FH: EARLY CORONARY ARTERY DISEASE: ICD-10-CM

## 2019-12-02 DIAGNOSIS — E78.2 MIXED HYPERLIPIDEMIA: ICD-10-CM

## 2019-12-02 PROCEDURE — 75574 CT ANGIO HRT W/3D IMAGE: CPT | Performed by: INTERNAL MEDICINE

## 2019-12-02 PROCEDURE — 82565 ASSAY OF CREATININE: CPT

## 2019-12-02 RX ORDER — NITROGLYCERIN 0.4 MG/1
TABLET SUBLINGUAL
Status: COMPLETED
Start: 2019-12-02 | End: 2019-12-02

## 2019-12-02 RX ORDER — NITROGLYCERIN 0.4 MG/1
0.4 TABLET SUBLINGUAL ONCE
Status: COMPLETED | OUTPATIENT
Start: 2019-12-02 | End: 2019-12-02

## 2019-12-02 RX ADMIN — NITROGLYCERIN 0.4 MG: 0.4 TABLET SUBLINGUAL at 09:26:00

## 2019-12-09 ENCOUNTER — TELEPHONE (OUTPATIENT)
Dept: CARDIOLOGY | Age: 49
End: 2019-12-09

## 2019-12-12 PROBLEM — D12.5 BENIGN NEOPLASM OF SIGMOID COLON: Status: ACTIVE | Noted: 2019-12-12

## 2019-12-12 PROBLEM — Z83.71 FAMILY HISTORY OF COLONIC POLYPS: Status: ACTIVE | Noted: 2019-12-12

## 2019-12-12 PROBLEM — Z83.719 FAMILY HISTORY OF COLONIC POLYPS: Status: ACTIVE | Noted: 2019-12-12

## 2019-12-29 DIAGNOSIS — Z82.49 FAMILY HISTORY OF EARLY CAD: ICD-10-CM

## 2019-12-29 DIAGNOSIS — E78.00 ELEVATED CHOLESTEROL: ICD-10-CM

## 2019-12-30 RX ORDER — ROSUVASTATIN CALCIUM 5 MG/1
TABLET, COATED ORAL
Qty: 90 TABLET | Refills: 1 | Status: SHIPPED | OUTPATIENT
Start: 2019-12-30 | End: 2020-03-18

## 2020-03-18 ENCOUNTER — TELEPHONE (OUTPATIENT)
Dept: FAMILY MEDICINE CLINIC | Facility: CLINIC | Age: 50
End: 2020-03-18

## 2020-03-18 DIAGNOSIS — E78.00 ELEVATED CHOLESTEROL: ICD-10-CM

## 2020-03-18 DIAGNOSIS — Z82.49 FAMILY HISTORY OF EARLY CAD: ICD-10-CM

## 2020-03-18 RX ORDER — ROSUVASTATIN CALCIUM 5 MG/1
5 TABLET, COATED ORAL NIGHTLY
Qty: 90 TABLET | Refills: 0 | Status: SHIPPED | OUTPATIENT
Start: 2020-03-18 | End: 2020-06-16

## 2020-03-18 NOTE — TELEPHONE ENCOUNTER
Pt is requesting a refill on her cholesterol medication, pt is not sure about the name but thinks it might be rosuvatatin, please call and advise. Pt asked to send it to her [facility] pharmacy.

## 2020-06-10 DIAGNOSIS — D72.820 LYMPHOCYTOSIS: ICD-10-CM

## 2020-06-10 DIAGNOSIS — C91.10 CLL (CHRONIC LYMPHOCYTIC LEUKEMIA) (HCC): Primary | ICD-10-CM

## 2020-06-11 ENCOUNTER — LAB ENCOUNTER (OUTPATIENT)
Dept: LAB | Age: 50
End: 2020-06-11
Attending: FAMILY MEDICINE
Payer: COMMERCIAL

## 2020-06-11 DIAGNOSIS — C91.10 CLL (CHRONIC LYMPHOCYTIC LEUKEMIA) (HCC): ICD-10-CM

## 2020-06-11 DIAGNOSIS — E78.00 ELEVATED CHOLESTEROL: ICD-10-CM

## 2020-06-11 DIAGNOSIS — D72.820 LYMPHOCYTOSIS: ICD-10-CM

## 2020-06-11 DIAGNOSIS — Z82.49 FAMILY HISTORY OF EARLY CAD: ICD-10-CM

## 2020-06-11 PROCEDURE — 36415 COLL VENOUS BLD VENIPUNCTURE: CPT | Performed by: FAMILY MEDICINE

## 2020-06-11 PROCEDURE — 80061 LIPID PANEL: CPT | Performed by: FAMILY MEDICINE

## 2020-06-11 PROCEDURE — 80053 COMPREHEN METABOLIC PANEL: CPT | Performed by: FAMILY MEDICINE

## 2020-06-16 ENCOUNTER — TELEMEDICINE (OUTPATIENT)
Dept: FAMILY MEDICINE CLINIC | Facility: CLINIC | Age: 50
End: 2020-06-16
Payer: COMMERCIAL

## 2020-06-16 DIAGNOSIS — E78.00 ELEVATED CHOLESTEROL: Primary | ICD-10-CM

## 2020-06-16 DIAGNOSIS — C91.10 CLL (CHRONIC LYMPHOCYTIC LEUKEMIA) (HCC): ICD-10-CM

## 2020-06-16 DIAGNOSIS — M54.2 NECK PAIN: ICD-10-CM

## 2020-06-16 DIAGNOSIS — Z82.49 FAMILY HISTORY OF EARLY CAD: ICD-10-CM

## 2020-06-16 PROCEDURE — 99214 OFFICE O/P EST MOD 30 MIN: CPT | Performed by: FAMILY MEDICINE

## 2020-06-16 RX ORDER — ROSUVASTATIN CALCIUM 5 MG/1
5 TABLET, COATED ORAL NIGHTLY
Qty: 90 TABLET | Refills: 1 | Status: SHIPPED | OUTPATIENT
Start: 2020-06-16 | End: 2020-12-18

## 2020-06-16 NOTE — PROGRESS NOTES
This visit is conducted using Telemedicine with live, interactive video and audio.       Telehealth outside of [address] Verbal Consent   I conducted a telehealth visit with Liliya Denton today, 06/16/20, which was completed using two-way, real- and lightheadedness. No exertional symptoms.     Component      Latest Ref Rng & Units 6/11/2020 6/11/2020 4/12/2019 3/15/2019           8:23 AM  8:23 AM     Glucose      70 - 99 mg/dL 95  94    Sodium      136 - 145 mmol/L 137  139    Potassium      3.5 - Hemoglobin      12.0 - 16.0 g/dL 13.5 13.3 13.7   Hematocrit      35.0 - 48.0 % 41.9 40.3 41.1   Platelet Count      709.7 - 450.0 10(3)uL 164.0 189.0 171.0   MCV      80.0 - 100.0 fL 91.5 91.0 89.3   MCH      26.0 - 34.0 pg 29.5 30.0 29.8   MCHC      31 Lasik   • INTRAOPERATIVE NEURO MONITORING N/A 4/15/2015    Performed by Tano Amin MD at [address]   • [address] N/A 4/15/2015    Performed by Tano Amin MD at [address]   • NEEDLE BIOPSY RIGHT Right 06/2017    BENIGN Connie Nichole history  ALL/ASTHMA: denies hx of allergy or asthma    EXAM:   alert, appears stated age and cooperative, Normocephalic, without obvious abnormality, atraumatic, lips, mucosa, and tongue normal; teeth and gums normal, Speaking in full sentences comfortably

## 2020-06-25 ENCOUNTER — TELEPHONE (OUTPATIENT)
Dept: FAMILY MEDICINE CLINIC | Facility: CLINIC | Age: 50
End: 2020-06-25

## 2020-06-25 DIAGNOSIS — M54.2 NECK PAIN: Primary | ICD-10-CM

## 2020-06-25 NOTE — TELEPHONE ENCOUNTER
PT STATES SHE SPOKE TO DR. ENNIS REGARDING HER NECK PAIN  DOES DR. ENNIS WANT TO ORDER AN XRAY  PLEASE ADVISE

## 2020-06-25 NOTE — TELEPHONE ENCOUNTER
Recs from televisit 6/16/2020:  4. Neck pain  X-ray if not better in 2-4 weeks     LMTCB to discuss.

## 2020-06-26 NOTE — TELEPHONE ENCOUNTER
Spoke with patient:  Patient has been Icing he neck- no relief from that, takes ibuprofen- relieves pain but patient doesn't want to take all the time, mild stretching- isn't stretching too much because she doesn't know what to do.   Pain was a subtle incre

## 2020-06-29 ENCOUNTER — HOSPITAL ENCOUNTER (OUTPATIENT)
Dept: GENERAL RADIOLOGY | Age: 50
Discharge: HOME | End: 2020-06-29
Attending: FAMILY MEDICINE
Payer: COMMERCIAL

## 2020-06-29 DIAGNOSIS — M54.2 NECK PAIN: ICD-10-CM

## 2020-06-29 PROCEDURE — 72050 X-RAY EXAM NECK SPINE 4/5VWS: CPT | Performed by: FAMILY MEDICINE

## 2020-07-08 ENCOUNTER — TELEPHONE (OUTPATIENT)
Dept: FAMILY MEDICINE CLINIC | Facility: CLINIC | Age: 50
End: 2020-07-08

## 2020-07-08 DIAGNOSIS — M54.2 NECK PAIN: Primary | ICD-10-CM

## 2020-07-08 DIAGNOSIS — M47.812 OSTEOARTHRITIS OF CERVICAL SPINE, UNSPECIFIED SPINAL OSTEOARTHRITIS COMPLICATION STATUS: ICD-10-CM

## 2020-07-08 NOTE — TELEPHONE ENCOUNTER
6/26/2020 xray:  CONCLUSION:  Mild degenerative changes of the cervical spine are present. If clinical symptoms persist then recommend MRI. Dr. Babatunde Bella, please advise.

## 2020-07-30 ENCOUNTER — OFFICE VISIT (OUTPATIENT)
Dept: SURGERY | Facility: CLINIC | Age: 50
End: 2020-07-30
Payer: COMMERCIAL

## 2020-07-30 VITALS — DIASTOLIC BLOOD PRESSURE: 78 MMHG | SYSTOLIC BLOOD PRESSURE: 118 MMHG | HEART RATE: 88 BPM

## 2020-07-30 DIAGNOSIS — S13.4XXA SPRAIN OF LIGAMENTS OF CERVICAL SPINE, INITIAL ENCOUNTER: Primary | ICD-10-CM

## 2020-07-30 DIAGNOSIS — M62.89 MUSCLE TIGHTNESS: ICD-10-CM

## 2020-07-30 DIAGNOSIS — M54.2 NECK PAIN: ICD-10-CM

## 2020-07-30 DIAGNOSIS — M50.30 DDD (DEGENERATIVE DISC DISEASE), CERVICAL: ICD-10-CM

## 2020-07-30 DIAGNOSIS — M62.838 MUSCLE SPASM: ICD-10-CM

## 2020-07-30 PROCEDURE — 99214 OFFICE O/P EST MOD 30 MIN: CPT | Performed by: PHYSICIAN ASSISTANT

## 2020-07-30 PROCEDURE — 3074F SYST BP LT 130 MM HG: CPT | Performed by: PHYSICIAN ASSISTANT

## 2020-07-30 PROCEDURE — 3078F DIAST BP <80 MM HG: CPT | Performed by: PHYSICIAN ASSISTANT

## 2020-07-30 RX ORDER — METHYLPREDNISOLONE 4 MG/1
TABLET ORAL
Qty: 1 PACKAGE | Refills: 0 | Status: SHIPPED | OUTPATIENT
Start: 2020-07-30 | End: 2020-08-17

## 2020-07-30 RX ORDER — CYCLOBENZAPRINE HCL 10 MG
TABLET ORAL 3 TIMES DAILY PRN
Qty: 40 TABLET | Refills: 0 | Status: SHIPPED | OUTPATIENT
Start: 2020-07-30 | End: 2020-08-17

## 2020-07-30 NOTE — PROGRESS NOTES
Patient: Jorden Rust  Medical Record Number: [id number]  YOB: 1970  PCP: Nahed Leo DO    Reason for visit: Cervical pain    HISTORY OF CHIEF COMPLAINT:    Jorden Rust is a very pleasant 51year old female, with a pertinent PMH of • High Cholesterol Mother    • Colon Polyps Mother    • Ovarian Cancer Paternal Grandmother    • Breast Cancer Paternal Grandmother 54        approx age   • Diabetes Father    • Heart Disease Father    • Other (CAD) Father    • Other (CHF) Father    • He Normocephalic, atraumatic. RESPIRATORY RATE: Easy and Even   MUSCULOSKELETAL: Muscle tightness and knot of left upper trapezius  SKIN: Warm and dry  NEURO: Awake, alert and orientated.  Speech fluent, comprehension intact, answering questions appropriately Bri Egan MD on 6/29/2020 at 12:23 PM         DATE OF SERVICE: 07.17.2020     MRI SPINE CERVICAL (CPT=72141)  HISTORY: Cervicalgia. COMPARISON: None.   TECHNIQUE: Multiplanar multisequence MRI of the cervical spine was obtained utilizing the [address] [place] [address]. MAKING:     ASSESSMENT and PLAN:  (S13.4XXA) Sprain of ligaments of cervical spine, initial encounter  (primary encounter diagnosis)    (M54.2) Neck pain    (B88.002) Muscle spasm    (M62.89) Muscle tightness    (M50.30) DDD (degenerative disc disease), ce satisfaction.        Total visit time: 30 min  More than 50% spent coordinating care, providing patient education, reviewing imaging, discussing further imaging, discussing activity, discussing soft foam cervical collar, discussing Aspen collar, discussing

## 2020-07-30 NOTE — PROGRESS NOTES
Location of Pain: neck, center and left    Date Pain Began: March, 2020          Work Related:   No        Receiving Work Comp/Disability:   No    Numeric Rating Scale:  Pain at Present:  4/10

## 2020-08-14 ENCOUNTER — HOSPITAL ENCOUNTER (OUTPATIENT)
Dept: GENERAL RADIOLOGY | Age: 50
Discharge: HOME | End: 2020-08-14
Attending: PHYSICIAN ASSISTANT
Payer: COMMERCIAL

## 2020-08-14 DIAGNOSIS — M62.89 MUSCLE TIGHTNESS: ICD-10-CM

## 2020-08-14 DIAGNOSIS — M54.2 NECK PAIN: ICD-10-CM

## 2020-08-14 DIAGNOSIS — M50.30 DDD (DEGENERATIVE DISC DISEASE), CERVICAL: ICD-10-CM

## 2020-08-14 DIAGNOSIS — S13.4XXA SPRAIN OF LIGAMENTS OF CERVICAL SPINE, INITIAL ENCOUNTER: ICD-10-CM

## 2020-08-14 DIAGNOSIS — M62.838 MUSCLE SPASM: ICD-10-CM

## 2020-08-14 PROCEDURE — 72040 X-RAY EXAM NECK SPINE 2-3 VW: CPT | Performed by: PHYSICIAN ASSISTANT

## 2020-08-17 ENCOUNTER — OFFICE VISIT (OUTPATIENT)
Dept: FAMILY MEDICINE CLINIC | Facility: CLINIC | Age: 50
End: 2020-08-17
Payer: COMMERCIAL

## 2020-08-17 VITALS
HEIGHT: 62 IN | HEART RATE: 81 BPM | DIASTOLIC BLOOD PRESSURE: 82 MMHG | SYSTOLIC BLOOD PRESSURE: 138 MMHG | OXYGEN SATURATION: 98 % | RESPIRATION RATE: 18 BRPM | WEIGHT: 142 LBS | BODY MASS INDEX: 26.13 KG/M2

## 2020-08-17 DIAGNOSIS — M54.2 CERVICALGIA: ICD-10-CM

## 2020-08-17 DIAGNOSIS — Z00.00 ROUTINE GENERAL MEDICAL EXAMINATION AT A HEALTH CARE FACILITY: Primary | ICD-10-CM

## 2020-08-17 DIAGNOSIS — Z12.4 SCREENING FOR CERVICAL CANCER: ICD-10-CM

## 2020-08-17 DIAGNOSIS — Z12.31 ENCOUNTER FOR SCREENING MAMMOGRAM FOR BREAST CANCER: ICD-10-CM

## 2020-08-17 PROCEDURE — 3008F BODY MASS INDEX DOCD: CPT | Performed by: INTERNAL MEDICINE

## 2020-08-17 PROCEDURE — 88175 CYTOPATH C/V AUTO FLUID REDO: CPT | Performed by: INTERNAL MEDICINE

## 2020-08-17 PROCEDURE — 3075F SYST BP GE 130 - 139MM HG: CPT | Performed by: INTERNAL MEDICINE

## 2020-08-17 PROCEDURE — 87624 HPV HI-RISK TYP POOLED RSLT: CPT | Performed by: INTERNAL MEDICINE

## 2020-08-17 PROCEDURE — 3079F DIAST BP 80-89 MM HG: CPT | Performed by: INTERNAL MEDICINE

## 2020-08-17 PROCEDURE — 99396 PREV VISIT EST AGE 40-64: CPT | Performed by: INTERNAL MEDICINE

## 2020-08-17 NOTE — PATIENT INSTRUCTIONS
Prevention Guidelines, Women Ages 36 to 52  Screening tests and vaccines are an important part of managing your health. A screening test is done to find diseases in people who don't have any symptoms.  The goal is to find a disease early so lifestyle stauffer · Flexible sigmoidoscopy every 5 years, or  · Colonoscopy every 10 years, or  · CT colonography (virtual colonoscopy) every 5 years, or  · Yearly fecal occult blood test, or  · Yearly fecal immunochemical test every year, or  · Stool DNA test, every 3 year Chickenpox (varicella) All women in this age group who have no record of this infection or vaccine 2 doses; the second dose should be given at least 4 weeks after the first dose   Hepatitis A Women at increased risk for infection–talk with your healthcare Use of tobacco and the health effects it can cause All women in this age group Every exam   1 American Diabetes Association  2 American College of Obstetricians and Gynecologists   [address]  [address] [facility]  [place]

## 2020-08-18 LAB — HPV I/H RISK 1 DNA SPEC QL NAA+PROBE: NEGATIVE

## 2020-08-18 NOTE — PROGRESS NOTES
HPI:   Jannette Ashley is a 51year old female who presents for a well woman exam. Symptoms: denies discharge, itching, burning or dysuria, trace spotting occasionally but otherwise no menses. No LMP recorded.  (Menstrual status: IUD - Intrauterine Devic Paternal Grandmother    • Breast Cancer Paternal Grandmother 54        approx age   • Diabetes Father    • Heart Disease Father    • Other (CAD) Father    • Other (CHF) Father    • Heart Disease Paternal Grandfather    • Heart Disease Maternal Grandfather supple,no adenopathy, no thyromegaly  BREASTS: no retractions, no suspicious mass, no nipple discharge or axillary lymphadenopathy  LUNGS: clear to auscultation, easy breathing  CV: normal S1S2, RRR without murmur  GI: BSs present, no tenderness or organom

## 2020-09-30 ENCOUNTER — HOSPITAL ENCOUNTER (OUTPATIENT)
Dept: MAMMOGRAPHY | Age: 50
Discharge: HOME | End: 2020-09-30
Attending: FAMILY MEDICINE
Payer: COMMERCIAL

## 2020-09-30 DIAGNOSIS — Z12.31 ENCOUNTER FOR SCREENING MAMMOGRAM FOR BREAST CANCER: ICD-10-CM

## 2020-09-30 PROCEDURE — 77067 SCR MAMMO BI INCL CAD: CPT | Performed by: INTERNAL MEDICINE

## 2020-09-30 PROCEDURE — 77063 BREAST TOMOSYNTHESIS BI: CPT | Performed by: INTERNAL MEDICINE

## 2020-10-22 ENCOUNTER — OFFICE VISIT (OUTPATIENT)
Dept: SURGERY | Facility: CLINIC | Age: 50
End: 2020-10-22
Payer: COMMERCIAL

## 2020-10-22 VITALS — SYSTOLIC BLOOD PRESSURE: 110 MMHG | DIASTOLIC BLOOD PRESSURE: 80 MMHG | HEART RATE: 76 BPM

## 2020-10-22 DIAGNOSIS — S13.4XXA SPRAIN OF LIGAMENTS OF CERVICAL SPINE, INITIAL ENCOUNTER: ICD-10-CM

## 2020-10-22 DIAGNOSIS — M54.2 CERVICAL PAIN: ICD-10-CM

## 2020-10-22 DIAGNOSIS — M54.2 NECK PAIN: Primary | ICD-10-CM

## 2020-10-22 DIAGNOSIS — R22.1 LOCALIZED SWELLING, MASS AND LUMP, NECK: ICD-10-CM

## 2020-10-22 PROCEDURE — 99213 OFFICE O/P EST LOW 20 MIN: CPT | Performed by: PHYSICIAN ASSISTANT

## 2020-10-22 PROCEDURE — 3074F SYST BP LT 130 MM HG: CPT | Performed by: PHYSICIAN ASSISTANT

## 2020-10-22 PROCEDURE — 3079F DIAST BP 80-89 MM HG: CPT | Performed by: PHYSICIAN ASSISTANT

## 2020-10-22 NOTE — PROGRESS NOTES
Patient: Buddy Underwood  Medical Record Number: [id number]  YOB: 1970  PCP: Karly Drummond DO    HISTORY OF CHIEF COMPLAINT:    Buddy Underwood is a very pleasant 51year old female returns in follow-up. She works for [employer]. Brain cyst 3/2015   • CLL (chronic lymphocytic leukemia) ([facility] 2019   • Concussion 12/30/14   • High cholesterol       Past Surgical History:   Procedure Laterality Date   • BRAIN SURGERY  04/15/15    LEFT PARIETAL CRANIOTOMY FOR TUMOR RESECTION  WITH NEUR 3-4x/month, 1-3 drinks      Drug use: No    Other Topics      Concerns:        Caffeine Concern: Yes          coffee, soda        Exercise: Yes          couple days per week     No Known Allergies   Current Medications:  Current Outpatient Medications   Me minimal motion at C4-5 and C5-6 she improves her lordosis on extension with a slight retrolisthesis of C5-6. She has spondylosis at C4-5 and C5-6.   No fractures appreciated of the spinous process of C6 or 7 or T1    PROCEDURE:  XR CERVICAL SPINE    There foraminal stenosis. C7-T1: No significant spinal canal or foraminal stenosis. Other: None.  =====  IMPRESSION:  1.   Edema adjacent to the C7 spinous process suggesting soft tissue injury/sprain including  suggestion of disruption of the nuchal ligament a

## 2020-10-22 NOTE — PATIENT INSTRUCTIONS
ASSESSMENT:  -C6 C7-T1 spinous process tenderness with edema on MRI  -C4-5 C5-6 spondylosis without axial neck pain    PLAN:  -Patient has ongoing pain over the spinous process and soft tissue of C6-7 T1 that has been going on since March without any injur

## 2020-10-22 NOTE — PROGRESS NOTES
Patient here for a follow up post imaging. Patient states pain is located in the middle neck at a level of 3/10, with 9/10 at it's worst.     X-ray cervical completed on 8/14/20.  Patient states her PT sessions 3 times a week has been helping with the p

## 2020-11-18 ENCOUNTER — HOSPITAL ENCOUNTER (OUTPATIENT)
Dept: MRI IMAGING | Facility: HOSPITAL | Age: 50
Discharge: HOME | End: 2020-11-18
Attending: PHYSICIAN ASSISTANT
Payer: COMMERCIAL

## 2020-11-18 DIAGNOSIS — S13.4XXA SPRAIN OF LIGAMENTS OF CERVICAL SPINE, INITIAL ENCOUNTER: ICD-10-CM

## 2020-11-18 DIAGNOSIS — M54.2 CERVICAL PAIN: ICD-10-CM

## 2020-11-18 DIAGNOSIS — R22.1 LOCALIZED SWELLING, MASS AND LUMP, NECK: ICD-10-CM

## 2020-11-18 DIAGNOSIS — M54.2 NECK PAIN: ICD-10-CM

## 2020-11-18 PROCEDURE — 82565 ASSAY OF CREATININE: CPT

## 2020-11-18 PROCEDURE — A9575 INJ GADOTERATE MEGLUMI 0.1ML: HCPCS | Performed by: PHYSICIAN ASSISTANT

## 2020-11-18 PROCEDURE — 72156 MRI NECK SPINE W/O & W/DYE: CPT | Performed by: PHYSICIAN ASSISTANT

## 2020-12-16 ENCOUNTER — TELEMEDICINE (OUTPATIENT)
Dept: FAMILY MEDICINE CLINIC | Facility: CLINIC | Age: 50
End: 2020-12-16
Payer: COMMERCIAL

## 2020-12-16 DIAGNOSIS — U07.1 COVID-19 VIRUS INFECTION: Primary | ICD-10-CM

## 2020-12-16 PROCEDURE — 99213 OFFICE O/P EST LOW 20 MIN: CPT | Performed by: FAMILY MEDICINE

## 2020-12-16 RX ORDER — AZITHROMYCIN 250 MG/1
TABLET, FILM COATED ORAL
Qty: 6 TABLET | Refills: 0 | Status: SHIPPED | OUTPATIENT
Start: 2020-12-16 | End: 2020-12-21

## 2020-12-16 RX ORDER — ALBUTEROL SULFATE 90 UG/1
2 AEROSOL, METERED RESPIRATORY (INHALATION) EVERY 4 HOURS PRN
Qty: 1 INHALER | Refills: 6 | Status: SHIPPED | OUTPATIENT
Start: 2020-12-16 | End: 2021-06-08

## 2020-12-16 NOTE — PROGRESS NOTES
This visit is conducted using Telemedicine with live, interactive video and audio.     Telehealth outside of [address] Verbal Consent   I conducted a telehealth visit with Ashley Perez today, 12/16/20, which was completed using two-way, real-ti cooperative, Normocephalic, without obvious abnormality, atraumatic, lips, mucosa, and tongue normal; teeth and gums normal, Speaking in full sentences comfortably, Normal work of breathing, Skin color, texture, turgor normal. No rashes or lesions and age

## 2020-12-18 DIAGNOSIS — E78.00 ELEVATED CHOLESTEROL: ICD-10-CM

## 2020-12-18 DIAGNOSIS — Z82.49 FAMILY HISTORY OF EARLY CAD: ICD-10-CM

## 2020-12-18 RX ORDER — ROSUVASTATIN CALCIUM 5 MG/1
5 TABLET, COATED ORAL NIGHTLY
Qty: 90 TABLET | Refills: 1 | Status: SHIPPED | OUTPATIENT
Start: 2020-12-18 | End: 2021-06-21

## 2020-12-23 ENCOUNTER — TELEMEDICINE (OUTPATIENT)
Dept: FAMILY MEDICINE CLINIC | Facility: CLINIC | Age: 50
End: 2020-12-23
Payer: COMMERCIAL

## 2020-12-23 DIAGNOSIS — U07.1 COVID-19 VIRUS INFECTION: Primary | ICD-10-CM

## 2020-12-23 PROCEDURE — 99213 OFFICE O/P EST LOW 20 MIN: CPT | Performed by: FAMILY MEDICINE

## 2020-12-23 NOTE — PROGRESS NOTES
HPI:    Patient ID: Celeste Bueno is a 53year old female. Pt has previously tested positive for COVID-19. She completed telehealth appointment on 12/16/2020 where she was placed on zpak, zinc and vitamin D. She was also given proair to treat her SOB.  Zaida Grandchild normal.     Two way communication was completed with audio and video due to COVID lock down        ASSESSMENT/PLAN:   Covid-19 virus infection  (primary encounter diagnosis)    9 minute video visit    1.  COVID-19 virus infection  No additional medications

## 2020-12-28 ENCOUNTER — LAB ENCOUNTER (OUTPATIENT)
Dept: LAB | Age: 50
End: 2020-12-28
Attending: INTERNAL MEDICINE
Payer: COMMERCIAL

## 2020-12-28 DIAGNOSIS — C91.90: Primary | ICD-10-CM

## 2020-12-28 LAB
ALBUMIN SERPL-MCNC: 4 G/DL (ref 3.4–5)
ALBUMIN/GLOB SERPL: 1.3 {RATIO} (ref 1–2)
ALP LIVER SERPL-CCNC: 84 U/L
ALT SERPL-CCNC: 92 U/L
ANION GAP SERPL CALC-SCNC: 10 MMOL/L (ref 0–18)
AST SERPL-CCNC: 49 U/L (ref 15–37)
BASOPHILS # BLD AUTO: 0.06 X10(3) UL (ref 0–0.2)
BASOPHILS NFR BLD AUTO: 0.4 %
BILIRUB SERPL-MCNC: 0.2 MG/DL (ref 0.1–2)
BUN BLD-MCNC: 15 MG/DL (ref 7–18)
BUN/CREAT SERPL: 13.4 (ref 10–20)
CALCIUM BLD-MCNC: 9.4 MG/DL (ref 8.5–10.1)
CHLORIDE SERPL-SCNC: 107 MMOL/L (ref 98–112)
CO2 SERPL-SCNC: 29 MMOL/L (ref 21–32)
CREAT BLD-MCNC: 1.12 MG/DL
DEPRECATED RDW RBC AUTO: 38.8 FL (ref 35.1–46.3)
EOSINOPHIL # BLD AUTO: 0.12 X10(3) UL (ref 0–0.7)
EOSINOPHIL NFR BLD AUTO: 0.8 %
ERYTHROCYTE [DISTWIDTH] IN BLOOD BY AUTOMATED COUNT: 12 % (ref 11–15)
GLOBULIN PLAS-MCNC: 3 G/DL (ref 2.8–4.4)
GLUCOSE BLD-MCNC: 87 MG/DL (ref 70–99)
HCT VFR BLD AUTO: 41.2 %
HGB BLD-MCNC: 13.8 G/DL
IGA SERPL-MCNC: 127 MG/DL (ref 70–312)
IGM SERPL-MCNC: 74.1 MG/DL (ref 43–279)
IMM GRANULOCYTES # BLD AUTO: 0.05 X10(3) UL (ref 0–1)
IMM GRANULOCYTES NFR BLD: 0.3 %
IMMUNOGLOBULIN PNL SER-MCNC: 891 MG/DL (ref 791–1643)
LDH SERPL L TO P-CCNC: 216 U/L
LYMPHOCYTES # BLD AUTO: 9.55 X10(3) UL (ref 1–4)
LYMPHOCYTES NFR BLD AUTO: 64.4 %
M PROTEIN MFR SERPL ELPH: 7 G/DL (ref 6.4–8.2)
MCH RBC QN AUTO: 29.7 PG (ref 26–34)
MCHC RBC AUTO-ENTMCNC: 33.5 G/DL (ref 31–37)
MCV RBC AUTO: 88.6 FL
MONOCYTES # BLD AUTO: 0.66 X10(3) UL (ref 0.1–1)
MONOCYTES NFR BLD AUTO: 4.4 %
NEUTROPHILS # BLD AUTO: 4.4 X10 (3) UL (ref 1.5–7.7)
NEUTROPHILS # BLD AUTO: 4.4 X10(3) UL (ref 1.5–7.7)
NEUTROPHILS NFR BLD AUTO: 29.7 %
OSMOLALITY SERPL CALC.SUM OF ELEC: 302 MOSM/KG (ref 275–295)
PATIENT FASTING Y/N/NP: NO
PLATELET # BLD AUTO: 200 10(3)UL (ref 150–450)
POTASSIUM SERPL-SCNC: 4.1 MMOL/L (ref 3.5–5.1)
RBC # BLD AUTO: 4.65 X10(6)UL
SODIUM SERPL-SCNC: 146 MMOL/L (ref 136–145)
WBC # BLD AUTO: 14.8 X10(3) UL (ref 4–11)

## 2020-12-28 PROCEDURE — 36415 COLL VENOUS BLD VENIPUNCTURE: CPT

## 2020-12-28 PROCEDURE — 83615 LACTATE (LD) (LDH) ENZYME: CPT

## 2020-12-28 PROCEDURE — 80053 COMPREHEN METABOLIC PANEL: CPT

## 2020-12-28 PROCEDURE — 85025 COMPLETE CBC W/AUTO DIFF WBC: CPT

## 2020-12-28 PROCEDURE — 82784 ASSAY IGA/IGD/IGG/IGM EACH: CPT

## 2020-12-29 ENCOUNTER — TELEPHONE (OUTPATIENT)
Dept: FAMILY MEDICINE CLINIC | Facility: CLINIC | Age: 50
End: 2020-12-29

## 2020-12-29 ENCOUNTER — HOSPITAL ENCOUNTER (OUTPATIENT)
Dept: GENERAL RADIOLOGY | Age: 50
Discharge: HOME | End: 2020-12-29
Attending: FAMILY MEDICINE
Payer: COMMERCIAL

## 2020-12-29 DIAGNOSIS — R07.89 CHEST TIGHTNESS: ICD-10-CM

## 2020-12-29 DIAGNOSIS — R07.89 CHEST TIGHTNESS: Primary | ICD-10-CM

## 2020-12-29 PROCEDURE — 71046 X-RAY EXAM CHEST 2 VIEWS: CPT | Performed by: FAMILY MEDICINE

## 2020-12-29 NOTE — TELEPHONE ENCOUNTER
Pt had televisit 12/23/20,  covid +,  + chest pressure/tightness on and off, + cough, has pulse ox 97-98, worse at night, feels better when she stands straight up, no fever  Pt quarantine has ended. Okay for chest xray?  Pt finished zpak, using albuter

## 2020-12-29 NOTE — TELEPHONE ENCOUNTER
sure,   have her also follow up for phone visit in 1-2 days  wednesday or Thursday    Order placed   appt made    Pt notified

## 2020-12-29 NOTE — TELEPHONE ENCOUNTER
Pt still having chest tightness, COVID+, after her quarantine. She would like to know if a chest x-ray can be ordered?

## 2020-12-30 ENCOUNTER — VIRTUAL PHONE E/M (OUTPATIENT)
Dept: FAMILY MEDICINE CLINIC | Facility: CLINIC | Age: 50
End: 2020-12-30
Payer: COMMERCIAL

## 2020-12-30 ENCOUNTER — LAB ENCOUNTER (OUTPATIENT)
Dept: LAB | Age: 50
End: 2020-12-30
Attending: FAMILY MEDICINE
Payer: COMMERCIAL

## 2020-12-30 DIAGNOSIS — U07.1 COVID-19: Primary | ICD-10-CM

## 2020-12-30 DIAGNOSIS — U07.1 COVID-19: ICD-10-CM

## 2020-12-30 LAB
D-DIMER: <0.27 UG/ML FEU (ref ?–0.5)
TROPONIN I SERPL-MCNC: <0.045 NG/ML (ref ?–0.04)

## 2020-12-30 PROCEDURE — 84484 ASSAY OF TROPONIN QUANT: CPT

## 2020-12-30 PROCEDURE — 36415 COLL VENOUS BLD VENIPUNCTURE: CPT

## 2020-12-30 PROCEDURE — 85379 FIBRIN DEGRADATION QUANT: CPT

## 2020-12-30 PROCEDURE — 99213 OFFICE O/P EST LOW 20 MIN: CPT | Performed by: FAMILY MEDICINE

## 2020-12-30 NOTE — PROGRESS NOTES
HPI:    Patient ID: Yasmin Reynoso is a 53year old female. Pt has previously tested positive for COVID-19 on 12/12/2020. She completed telehealth appointment on 12/16/2020 where she was placed on zpak, zinc and vitamin D.  She was also given proair to tr D3) 2000 units Oral Tab Take by mouth. • Levonorgestrel (MIRENA) 20 MCG/24HR Intrauterine IUD 1 each by Intrauterine route once. Allergies:No Known Allergies   PHYSICAL EXAM:   Physical Exam   Psychiatric: She has a normal mood and affect.  Though

## 2021-01-27 ENCOUNTER — OFFICE VISIT (OUTPATIENT)
Dept: FAMILY MEDICINE CLINIC | Facility: CLINIC | Age: 51
End: 2021-01-27
Payer: COMMERCIAL

## 2021-01-27 VITALS
DIASTOLIC BLOOD PRESSURE: 76 MMHG | HEART RATE: 92 BPM | SYSTOLIC BLOOD PRESSURE: 114 MMHG | WEIGHT: 144 LBS | RESPIRATION RATE: 16 BRPM | BODY MASS INDEX: 26.5 KG/M2 | OXYGEN SATURATION: 98 % | HEIGHT: 62 IN

## 2021-01-27 DIAGNOSIS — R06.02 SHORTNESS OF BREATH: Primary | ICD-10-CM

## 2021-01-27 DIAGNOSIS — Z86.16 HISTORY OF 2019 NOVEL CORONAVIRUS DISEASE (COVID-19): ICD-10-CM

## 2021-01-27 DIAGNOSIS — G89.29 CHRONIC CHEST PAIN: ICD-10-CM

## 2021-01-27 DIAGNOSIS — R07.9 CHRONIC CHEST PAIN: ICD-10-CM

## 2021-01-27 PROCEDURE — 99214 OFFICE O/P EST MOD 30 MIN: CPT | Performed by: FAMILY MEDICINE

## 2021-01-27 PROCEDURE — 3078F DIAST BP <80 MM HG: CPT | Performed by: FAMILY MEDICINE

## 2021-01-27 PROCEDURE — 3008F BODY MASS INDEX DOCD: CPT | Performed by: FAMILY MEDICINE

## 2021-01-27 PROCEDURE — 3074F SYST BP LT 130 MM HG: CPT | Performed by: FAMILY MEDICINE

## 2021-01-27 RX ORDER — PREDNISONE 20 MG/1
TABLET ORAL
Qty: 13 TABLET | Refills: 0 | Status: SHIPPED | OUTPATIENT
Start: 2021-01-27 | End: 2021-06-08 | Stop reason: ALTCHOICE

## 2021-01-27 NOTE — PROGRESS NOTES
HPI:    Patient ID: Coty Holguin is a 53year old female. Shortness Of Breath  This is a chronic problem. Episode onset: 5 weeks ago. The problem occurs constantly. The problem has been waxing and waning.  Associated symptoms include chest pain and wh normal. Right eye exhibits no discharge. Left eye exhibits no discharge. Cardiovascular: Normal rate, regular rhythm and normal heart sounds. Pulmonary/Chest: Effort normal. No respiratory distress. She has wheezes (minimal).    Skin: She is not diaphor

## 2021-02-03 ENCOUNTER — HOSPITAL ENCOUNTER (OUTPATIENT)
Dept: CT IMAGING | Age: 51
Discharge: HOME | End: 2021-02-03
Attending: FAMILY MEDICINE
Payer: COMMERCIAL

## 2021-02-03 DIAGNOSIS — R07.9 CHRONIC CHEST PAIN: ICD-10-CM

## 2021-02-03 DIAGNOSIS — Z86.16 HISTORY OF 2019 NOVEL CORONAVIRUS DISEASE (COVID-19): ICD-10-CM

## 2021-02-03 DIAGNOSIS — R06.02 SHORTNESS OF BREATH: ICD-10-CM

## 2021-02-03 DIAGNOSIS — G89.29 CHRONIC CHEST PAIN: ICD-10-CM

## 2021-02-03 LAB — CREAT BLD-MCNC: 0.9 MG/DL

## 2021-02-03 PROCEDURE — 82565 ASSAY OF CREATININE: CPT

## 2021-02-03 PROCEDURE — 71275 CT ANGIOGRAPHY CHEST: CPT | Performed by: FAMILY MEDICINE

## 2021-06-08 ENCOUNTER — OFFICE VISIT (OUTPATIENT)
Dept: FAMILY MEDICINE CLINIC | Facility: CLINIC | Age: 51
End: 2021-06-08
Payer: COMMERCIAL

## 2021-06-08 ENCOUNTER — LAB ENCOUNTER (OUTPATIENT)
Dept: LAB | Age: 51
End: 2021-06-08
Attending: FAMILY MEDICINE
Payer: COMMERCIAL

## 2021-06-08 ENCOUNTER — HOSPITAL ENCOUNTER (OUTPATIENT)
Dept: GENERAL RADIOLOGY | Age: 51
Discharge: HOME | End: 2021-06-08
Attending: FAMILY MEDICINE
Payer: COMMERCIAL

## 2021-06-08 ENCOUNTER — EKG ENCOUNTER (OUTPATIENT)
Dept: LAB | Age: 51
End: 2021-06-08
Attending: FAMILY MEDICINE
Payer: COMMERCIAL

## 2021-06-08 VITALS
DIASTOLIC BLOOD PRESSURE: 74 MMHG | HEIGHT: 62 IN | OXYGEN SATURATION: 98 % | HEART RATE: 68 BPM | WEIGHT: 141 LBS | SYSTOLIC BLOOD PRESSURE: 112 MMHG | RESPIRATION RATE: 16 BRPM | BODY MASS INDEX: 25.95 KG/M2

## 2021-06-08 DIAGNOSIS — C91.10 CLL (CHRONIC LYMPHOCYTIC LEUKEMIA) (HCC): ICD-10-CM

## 2021-06-08 DIAGNOSIS — R07.89 ATYPICAL CHEST PAIN: ICD-10-CM

## 2021-06-08 DIAGNOSIS — U09.9 COVID-19 LONG HAULER: Primary | ICD-10-CM

## 2021-06-08 DIAGNOSIS — E04.9 GOITER: ICD-10-CM

## 2021-06-08 DIAGNOSIS — E78.00 ELEVATED CHOLESTEROL: ICD-10-CM

## 2021-06-08 DIAGNOSIS — R06.02 SOB (SHORTNESS OF BREATH): ICD-10-CM

## 2021-06-08 DIAGNOSIS — U09.9 COVID-19 LONG HAULER: ICD-10-CM

## 2021-06-08 DIAGNOSIS — Z82.49 FAMILY HISTORY OF EARLY CAD: ICD-10-CM

## 2021-06-08 DIAGNOSIS — U07.1 COVID-19 VIRUS INFECTION: ICD-10-CM

## 2021-06-08 PROCEDURE — 86800 THYROGLOBULIN ANTIBODY: CPT | Performed by: FAMILY MEDICINE

## 2021-06-08 PROCEDURE — 71046 X-RAY EXAM CHEST 2 VIEWS: CPT | Performed by: FAMILY MEDICINE

## 2021-06-08 PROCEDURE — 84439 ASSAY OF FREE THYROXINE: CPT | Performed by: FAMILY MEDICINE

## 2021-06-08 PROCEDURE — 3074F SYST BP LT 130 MM HG: CPT | Performed by: FAMILY MEDICINE

## 2021-06-08 PROCEDURE — 83615 LACTATE (LD) (LDH) ENZYME: CPT | Performed by: FAMILY MEDICINE

## 2021-06-08 PROCEDURE — 99214 OFFICE O/P EST MOD 30 MIN: CPT | Performed by: FAMILY MEDICINE

## 2021-06-08 PROCEDURE — 3078F DIAST BP <80 MM HG: CPT | Performed by: FAMILY MEDICINE

## 2021-06-08 PROCEDURE — 3008F BODY MASS INDEX DOCD: CPT | Performed by: FAMILY MEDICINE

## 2021-06-08 PROCEDURE — 93010 ELECTROCARDIOGRAM REPORT: CPT | Performed by: INTERNAL MEDICINE

## 2021-06-08 PROCEDURE — 80050 GENERAL HEALTH PANEL: CPT | Performed by: FAMILY MEDICINE

## 2021-06-08 PROCEDURE — 86376 MICROSOMAL ANTIBODY EACH: CPT | Performed by: FAMILY MEDICINE

## 2021-06-08 PROCEDURE — 80061 LIPID PANEL: CPT | Performed by: FAMILY MEDICINE

## 2021-06-08 PROCEDURE — 93005 ELECTROCARDIOGRAM TRACING: CPT

## 2021-06-08 RX ORDER — BUDESONIDE AND FORMOTEROL FUMARATE DIHYDRATE 160; 4.5 UG/1; UG/1
2 AEROSOL RESPIRATORY (INHALATION) 2 TIMES DAILY
Qty: 3 EACH | Refills: 0 | Status: SHIPPED | OUTPATIENT
Start: 2021-06-08 | End: 2021-06-30

## 2021-06-08 RX ORDER — ALBUTEROL SULFATE 90 UG/1
2 AEROSOL, METERED RESPIRATORY (INHALATION) EVERY 4 HOURS PRN
Qty: 3 EACH | Refills: 0 | Status: SHIPPED | OUTPATIENT
Start: 2021-06-08

## 2021-06-08 RX ORDER — PREDNISONE 20 MG/1
40 TABLET ORAL DAILY
Qty: 8 TABLET | Refills: 0 | Status: SHIPPED | OUTPATIENT
Start: 2021-06-08 | End: 2021-06-30 | Stop reason: ALTCHOICE

## 2021-06-08 RX ORDER — AZITHROMYCIN 250 MG/1
TABLET, FILM COATED ORAL
Qty: 6 TABLET | Refills: 0 | Status: SHIPPED | OUTPATIENT
Start: 2021-06-08 | End: 2021-06-30 | Stop reason: ALTCHOICE

## 2021-06-08 NOTE — PROGRESS NOTES
HPI:   Phill Herrera is a 53year old female who presents to discuss respiratory symptoms, h/o covid infection and  CLL      H/O COVID   Pt has had chronic lung issues   Pt cannot breathe for a couple of hours   Pt cannot take a full breath   Some tightn • BREAST BIOPSY Right 2017   • EYE SURGERY  10/2013    Lasik   • NEEDLE BIOPSY RIGHT Right 06/2017    BENIGN FIBROADENOMA      Family History   Problem Relation Age of Onset   • Breast Cancer Mother 46   • High Cholesterol Mother    • Colon Polyps Mother auscultation  NECK: supple,no adenopathy, +thyromegaly  HEENT: atraumatic, normocephalic,ears and throat are clear  EYES:PERRLA, EOMI, normal,conjunctiva are clear  SKIN: norashes,no suspicious lesions  GI: good BS's,no masses, HSM or tenderness  MUSCULOSK (PROAIR HFA) 108 (90 Base) MCG/ACT Inhalation Aero Soln; Inhale 2 puffs into the lungs every 4 (four) hours as needed for Wheezing. Dispense: 3 each;  Refill: 0      Questions answered and patient indicates understanding of these issues and agrees to the p

## 2021-06-09 ENCOUNTER — LAB ENCOUNTER (OUTPATIENT)
Dept: LAB | Facility: HOSPITAL | Age: 51
End: 2021-06-09
Attending: FAMILY MEDICINE
Payer: COMMERCIAL

## 2021-06-09 DIAGNOSIS — R06.02 SOB (SHORTNESS OF BREATH): ICD-10-CM

## 2021-06-09 DIAGNOSIS — R07.89 ATYPICAL CHEST PAIN: ICD-10-CM

## 2021-06-09 DIAGNOSIS — U09.9 COVID-19 LONG HAULER: ICD-10-CM

## 2021-06-09 PROCEDURE — 85379 FIBRIN DEGRADATION QUANT: CPT

## 2021-06-09 PROCEDURE — 36415 COLL VENOUS BLD VENIPUNCTURE: CPT

## 2021-06-21 DIAGNOSIS — E78.00 ELEVATED CHOLESTEROL: ICD-10-CM

## 2021-06-21 DIAGNOSIS — Z82.49 FAMILY HISTORY OF EARLY CAD: ICD-10-CM

## 2021-06-21 RX ORDER — ROSUVASTATIN CALCIUM 5 MG/1
5 TABLET, COATED ORAL NIGHTLY
Qty: 90 TABLET | Refills: 1 | Status: SHIPPED | OUTPATIENT
Start: 2021-06-21 | End: 2021-11-29

## 2021-06-28 ENCOUNTER — HOSPITAL ENCOUNTER (OUTPATIENT)
Dept: ULTRASOUND IMAGING | Age: 51
Discharge: HOME | End: 2021-06-28
Attending: FAMILY MEDICINE
Payer: COMMERCIAL

## 2021-06-28 DIAGNOSIS — E04.9 GOITER: ICD-10-CM

## 2021-06-28 PROCEDURE — 76536 US EXAM OF HEAD AND NECK: CPT | Performed by: FAMILY MEDICINE

## 2021-06-29 NOTE — PROGRESS NOTES
[facility] Progress Note      Patient Name:  Thomas Rueda  YOB: 1970  Medical Record Number:  [id number]    Date of visit: 6/30/2021    CHIEF COMPLAINT: Stage 0 CLL.      HPI:     53year old that I initially saw on 12/21/18 afte VITALS:  Height: --  Weight: 62.5 kg (137 lb 12.8 oz) (06/30 1157)  BSA (Calculated - sq m): --  Pulse: 75 (06/30 1157)  BP: 136/94 (06/30 1157)  Temp: 97.8 °F (36.6 °C) (06/30 1157)  Do Not Use - Resp Rate: --  SpO2: 96 % (06/30 1157)    PS:  ECOG: [address]  [facility], [place], [address]    6/30/2021

## 2021-06-30 ENCOUNTER — OFFICE VISIT (OUTPATIENT)
Dept: HEMATOLOGY/ONCOLOGY | Facility: HOSPITAL | Age: 51
End: 2021-06-30
Attending: INTERNAL MEDICINE
Payer: COMMERCIAL

## 2021-06-30 VITALS
WEIGHT: 137.81 LBS | SYSTOLIC BLOOD PRESSURE: 136 MMHG | HEART RATE: 75 BPM | BODY MASS INDEX: 25 KG/M2 | OXYGEN SATURATION: 96 % | DIASTOLIC BLOOD PRESSURE: 94 MMHG | TEMPERATURE: 98 F | RESPIRATION RATE: 18 BRPM

## 2021-06-30 DIAGNOSIS — C91.10 CLL (CHRONIC LYMPHOCYTIC LEUKEMIA) (HCC): ICD-10-CM

## 2021-06-30 DIAGNOSIS — R06.00 DOE (DYSPNEA ON EXERTION): ICD-10-CM

## 2021-06-30 DIAGNOSIS — R06.01 ORTHOPNEA: ICD-10-CM

## 2021-06-30 DIAGNOSIS — R06.02 SHORTNESS OF BREATH: Primary | ICD-10-CM

## 2021-06-30 PROCEDURE — 99214 OFFICE O/P EST MOD 30 MIN: CPT | Performed by: INTERNAL MEDICINE

## 2021-06-30 NOTE — PROGRESS NOTES
Pt had covid in Dec. Still dealing with SOB, and chest tightness. Intermittently on prednisone, that helps. Recent US of neck with 2 enlarged LN.    Education Record    Learner:  Patient    Disease / Diagnosis:  CLL  Barriers / Limitations:  None   Commen

## 2021-08-16 ENCOUNTER — OFFICE VISIT (OUTPATIENT)
Dept: FAMILY MEDICINE CLINIC | Facility: CLINIC | Age: 51
End: 2021-08-16
Payer: COMMERCIAL

## 2021-08-16 VITALS
BODY MASS INDEX: 25.58 KG/M2 | HEIGHT: 62 IN | RESPIRATION RATE: 16 BRPM | SYSTOLIC BLOOD PRESSURE: 108 MMHG | OXYGEN SATURATION: 97 % | DIASTOLIC BLOOD PRESSURE: 70 MMHG | WEIGHT: 139 LBS | HEART RATE: 73 BPM

## 2021-08-16 DIAGNOSIS — Z20.822 CLOSE EXPOSURE TO COVID-19 VIRUS: ICD-10-CM

## 2021-08-16 DIAGNOSIS — Z01.419 WELL WOMAN EXAM WITH ROUTINE GYNECOLOGICAL EXAM: Primary | ICD-10-CM

## 2021-08-16 DIAGNOSIS — Z00.00 ANNUAL PHYSICAL EXAM: ICD-10-CM

## 2021-08-16 DIAGNOSIS — Z12.31 ENCOUNTER FOR SCREENING MAMMOGRAM FOR HIGH-RISK PATIENT: ICD-10-CM

## 2021-08-16 PROCEDURE — 87624 HPV HI-RISK TYP POOLED RSLT: CPT | Performed by: FAMILY MEDICINE

## 2021-08-16 PROCEDURE — 88175 CYTOPATH C/V AUTO FLUID REDO: CPT | Performed by: FAMILY MEDICINE

## 2021-08-16 PROCEDURE — 3008F BODY MASS INDEX DOCD: CPT | Performed by: FAMILY MEDICINE

## 2021-08-16 PROCEDURE — 3078F DIAST BP <80 MM HG: CPT | Performed by: FAMILY MEDICINE

## 2021-08-16 PROCEDURE — 99396 PREV VISIT EST AGE 40-64: CPT | Performed by: FAMILY MEDICINE

## 2021-08-16 PROCEDURE — 3074F SYST BP LT 130 MM HG: CPT | Performed by: FAMILY MEDICINE

## 2021-08-16 NOTE — PROGRESS NOTES
HPI:   Mady Carranza is a 53year old female who presents for a complete physical exam.     Wt Readings from Last 6 Encounters:  08/16/21 : 139 lb (63 kg)  07/09/21 : 140 lb (63.5 kg)  06/30/21 : 137 lb 12.8 oz (62.5 kg)  06/08/21 : 141 lb (64 kg)  01[facility]27 [facility]   • Concussion 12/30/14   • COVID-19 12/2020   • High cholesterol       Past Surgical History:   Procedure Laterality Date   • BRAIN SURGERY  04/15/15    LEFT PARIETAL CRANIOTOMY FOR TUMOR RESECTION  WITH NEURONAVIGATION AND MICROSCOPE denies hx of allergy or asthma    EXAM:   /70   Pulse 73   Resp 16   Ht 5' 2\" (1.575 m)   Wt 139 lb (63 kg)   SpO2 97%   BMI 25.42 kg/m²   Body mass index is 25.42 kg/m².    GENERAL: alert and oriented X 3, well developed, well nourished,in no appare

## 2021-08-17 LAB — HPV I/H RISK 1 DNA SPEC QL NAA+PROBE: NEGATIVE

## 2021-08-30 ENCOUNTER — LAB ENCOUNTER (OUTPATIENT)
Dept: LAB | Age: 51
End: 2021-08-30
Attending: FAMILY MEDICINE
Payer: COMMERCIAL

## 2021-08-30 DIAGNOSIS — Z20.822 CLOSE EXPOSURE TO COVID-19 VIRUS: ICD-10-CM

## 2021-08-30 LAB — SARS-COV-2 IGG+IGM SERPL QL IA: REACTIVE

## 2021-08-30 PROCEDURE — 86769 SARS-COV-2 COVID-19 ANTIBODY: CPT | Performed by: FAMILY MEDICINE

## 2021-09-10 ENCOUNTER — HOSPITAL ENCOUNTER (OUTPATIENT)
Dept: CV DIAGNOSTICS | Facility: HOSPITAL | Age: 51
Discharge: HOME | End: 2021-09-10
Attending: INTERNAL MEDICINE
Payer: COMMERCIAL

## 2021-09-10 DIAGNOSIS — R06.01 ORTHOPNEA: ICD-10-CM

## 2021-09-10 DIAGNOSIS — R06.02 SHORTNESS OF BREATH: ICD-10-CM

## 2021-09-10 DIAGNOSIS — R06.00 DOE (DYSPNEA ON EXERTION): ICD-10-CM

## 2021-09-10 PROCEDURE — 93306 TTE W/DOPPLER COMPLETE: CPT | Performed by: INTERNAL MEDICINE

## 2021-10-13 ENCOUNTER — HOSPITAL ENCOUNTER (OUTPATIENT)
Dept: MAMMOGRAPHY | Age: 51
Discharge: HOME | End: 2021-10-13
Attending: FAMILY MEDICINE
Payer: COMMERCIAL

## 2021-10-13 DIAGNOSIS — Z12.31 ENCOUNTER FOR SCREENING MAMMOGRAM FOR HIGH-RISK PATIENT: ICD-10-CM

## 2021-10-13 PROCEDURE — 77067 SCR MAMMO BI INCL CAD: CPT | Performed by: FAMILY MEDICINE

## 2021-10-13 PROCEDURE — 77063 BREAST TOMOSYNTHESIS BI: CPT | Performed by: FAMILY MEDICINE

## 2021-11-09 ENCOUNTER — OFFICE VISIT (OUTPATIENT)
Dept: SLEEP CENTER | Age: 51
End: 2021-11-09
Attending: INTERNAL MEDICINE
Payer: COMMERCIAL

## 2021-11-09 DIAGNOSIS — R06.83 SNORING: ICD-10-CM

## 2021-11-09 PROCEDURE — 95806 SLEEP STUDY UNATT&RESP EFFT: CPT

## 2021-11-10 NOTE — PROCEDURES
[address] [address],[facility] [address]       Accredited by the [facility] ([facility])    PATIENT'S NAME:        Jillian Mario PHYSICIAN:   Dov Lockhart M.D. REFERRING PHYSICIAN:   Maggy Conroy.  Whit Sarabia

## 2021-11-29 DIAGNOSIS — Z82.49 FAMILY HISTORY OF EARLY CAD: ICD-10-CM

## 2021-11-29 DIAGNOSIS — E78.00 ELEVATED CHOLESTEROL: ICD-10-CM

## 2021-11-29 RX ORDER — ROSUVASTATIN CALCIUM 5 MG/1
TABLET, COATED ORAL
Qty: 90 TABLET | Refills: 1 | Status: SHIPPED | OUTPATIENT
Start: 2021-11-29

## 2021-11-30 ENCOUNTER — LAB ENCOUNTER (OUTPATIENT)
Dept: LAB | Age: 51
End: 2021-11-30
Attending: INTERNAL MEDICINE
Payer: COMMERCIAL

## 2021-11-30 DIAGNOSIS — R06.02 SHORTNESS OF BREATH: ICD-10-CM

## 2021-11-30 DIAGNOSIS — R74.8 ELEVATED LIVER ENZYMES: ICD-10-CM

## 2021-11-30 DIAGNOSIS — R06.00 DOE (DYSPNEA ON EXERTION): ICD-10-CM

## 2021-11-30 DIAGNOSIS — R06.01 ORTHOPNEA: ICD-10-CM

## 2021-11-30 DIAGNOSIS — C91.10 CLL (CHRONIC LYMPHOCYTIC LEUKEMIA) (HCC): ICD-10-CM

## 2021-11-30 PROCEDURE — 80053 COMPREHEN METABOLIC PANEL: CPT

## 2021-11-30 PROCEDURE — 84165 PROTEIN E-PHORESIS SERUM: CPT

## 2021-11-30 PROCEDURE — 85025 COMPLETE CBC W/AUTO DIFF WBC: CPT

## 2021-11-30 PROCEDURE — 86334 IMMUNOFIX E-PHORESIS SERUM: CPT

## 2021-11-30 PROCEDURE — 36415 COLL VENOUS BLD VENIPUNCTURE: CPT

## 2021-11-30 PROCEDURE — 82784 ASSAY IGA/IGD/IGG/IGM EACH: CPT

## 2021-11-30 PROCEDURE — 83883 ASSAY NEPHELOMETRY NOT SPEC: CPT

## 2021-12-20 ENCOUNTER — LAB ENCOUNTER (OUTPATIENT)
Dept: LAB | Age: 51
End: 2021-12-20
Attending: FAMILY MEDICINE
Payer: COMMERCIAL

## 2021-12-20 DIAGNOSIS — Z11.59 SCREENING FOR VIRAL DISEASE: ICD-10-CM

## 2021-12-20 DIAGNOSIS — Z01.818 PREOP EXAMINATION: ICD-10-CM

## 2021-12-23 ENCOUNTER — OFFICE VISIT (OUTPATIENT)
Dept: SLEEP CENTER | Age: 51
End: 2021-12-23
Attending: INTERNAL MEDICINE
Payer: COMMERCIAL

## 2021-12-23 DIAGNOSIS — G47.33 OSA (OBSTRUCTIVE SLEEP APNEA): ICD-10-CM

## 2021-12-23 PROCEDURE — 95811 POLYSOM 6/>YRS CPAP 4/> PARM: CPT

## 2022-05-18 ENCOUNTER — OFFICE VISIT (OUTPATIENT)
Dept: FAMILY MEDICINE CLINIC | Facility: CLINIC | Age: 52
End: 2022-05-18
Payer: COMMERCIAL

## 2022-05-18 VITALS
WEIGHT: 146 LBS | RESPIRATION RATE: 16 BRPM | DIASTOLIC BLOOD PRESSURE: 64 MMHG | HEIGHT: 62 IN | OXYGEN SATURATION: 98 % | BODY MASS INDEX: 26.87 KG/M2 | SYSTOLIC BLOOD PRESSURE: 102 MMHG | HEART RATE: 71 BPM

## 2022-05-18 DIAGNOSIS — J34.89 SINUS PAIN: Primary | ICD-10-CM

## 2022-05-18 DIAGNOSIS — J02.9 SORE THROAT: ICD-10-CM

## 2022-05-18 PROCEDURE — 3074F SYST BP LT 130 MM HG: CPT | Performed by: FAMILY MEDICINE

## 2022-05-18 PROCEDURE — 99213 OFFICE O/P EST LOW 20 MIN: CPT | Performed by: FAMILY MEDICINE

## 2022-05-18 PROCEDURE — 3008F BODY MASS INDEX DOCD: CPT | Performed by: FAMILY MEDICINE

## 2022-05-18 PROCEDURE — 3078F DIAST BP <80 MM HG: CPT | Performed by: FAMILY MEDICINE

## 2022-05-18 RX ORDER — AMOXICILLIN AND CLAVULANATE POTASSIUM 875; 125 MG/1; MG/1
1 TABLET, FILM COATED ORAL 2 TIMES DAILY
Qty: 20 TABLET | Refills: 0 | Status: SHIPPED | OUTPATIENT
Start: 2022-05-18 | End: 2022-05-28

## 2022-05-19 LAB — SARS-COV-2 RNA RESP QL NAA+PROBE: NOT DETECTED

## 2022-06-20 ENCOUNTER — TELEPHONE (OUTPATIENT)
Dept: HEMATOLOGY/ONCOLOGY | Facility: HOSPITAL | Age: 52
End: 2022-06-20

## 2022-06-20 DIAGNOSIS — C91.10 CLL (CHRONIC LYMPHOCYTIC LEUKEMIA) (HCC): Primary | ICD-10-CM

## 2022-06-20 NOTE — TELEPHONE ENCOUNTER
Pt LVM to verify her lab orders are in the system. Returned the call and let her know I verified there are orders in the system. In addition she is due her annual follow up. Call transferred to arrange.

## 2022-06-21 ENCOUNTER — TELEMEDICINE (OUTPATIENT)
Dept: FAMILY MEDICINE CLINIC | Facility: CLINIC | Age: 52
End: 2022-06-21

## 2022-06-21 DIAGNOSIS — U07.1 COVID-19 VIRUS INFECTION: Primary | ICD-10-CM

## 2022-06-21 PROCEDURE — 99213 OFFICE O/P EST LOW 20 MIN: CPT | Performed by: FAMILY MEDICINE

## 2022-06-21 NOTE — PROGRESS NOTES
This visit is conducted using Telemedicine with live, interactive video and audio. Telehealth outside of [address] Verbal Consent   I conducted a telehealth visit with Abena Juarez today, 06/21/22, which was completed using two-way, real-time interactive audio and video communication. This has been done in good gustabo to provide continuity of care in the best interest of the provider-patient relationship, due to the COVID -19 public health crisis/national emergency where restrictions of face-to-face office visits are ongoing. Every conscious effort was taken to allow for sufficient and adequate time to complete the visit. The patient was made aware of the limitations of the telehealth visit, including treatment limitations as no physical exam could be performed. The patient was advised to call 911 or to go to the ER in case there was an emergency. The patient was also advised of the potential privacy & security concerns related to the telehealth platform. The patient was made aware of where to find [facility] notice of privacy practices, telehealth consent form and other related consent forms and documents. which are located on the [facility] website. The patient verbally agreed to telehealth consent form, related consents and the risks discussed. Lastly, the patient confirmed that they were in [place]. Included in this visit, time may have been spent reviewing labs, medications, radiology tests and decision making. Appropriate medical decision-making and tests are ordered as detailed in the plan of care above. Coding/billing information is submitted for this visit based on complexity of care and/or time spent for the visit.     Pt here with covid     Tested + today   + cough congestion fatigue fever and chills   + sore throat and HA  No diarrhea   No sob         ROS otherwise negative  Past medical, surgical and social history reviewed    Exam   alert, appears stated age and cooperative, Normocephalic, without obvious abnormality, atraumatic, lips, mucosa, and tongue normal; teeth and gums normal, Speaking in full sentences comfortably, Normal work of breathing, Skin color, texture, turgor normal. No rashes or lesions and age appropriate, normal, logical connections, person, place and time/date and no suicidal ideation    A/P  Pt here with     1. COVID-19 virus infection    Vitamin D 3  5,000 IU daily   Fish oil 4,000 mg daily   Zinc 50mg daily   Quercetin 500mg daily     Start ASA 81mg daily for 2 weeks       - nirmatrelvir & ritonavir 20 x 150 MG & 10 x 100MG Oral Tablet Therapy Pack; Take two nirmatrelvir tablets (300 mg) with one ritonavir tablet (100 mg) together twice daily for 5 days  Dispense: 30 tablet;  Refill: 0        RTC  in one week if not better or sooner if needed  Questions answered and pt expressed understanding

## 2022-07-12 ENCOUNTER — TELEPHONE (OUTPATIENT)
Dept: FAMILY MEDICINE CLINIC | Facility: CLINIC | Age: 52
End: 2022-07-12

## 2022-07-12 ENCOUNTER — LAB ENCOUNTER (OUTPATIENT)
Dept: LAB | Age: 52
End: 2022-07-12
Attending: INTERNAL MEDICINE
Payer: COMMERCIAL

## 2022-07-12 ENCOUNTER — OFFICE VISIT (OUTPATIENT)
Dept: FAMILY MEDICINE CLINIC | Facility: CLINIC | Age: 52
End: 2022-07-12
Payer: COMMERCIAL

## 2022-07-12 ENCOUNTER — LAB ENCOUNTER (OUTPATIENT)
Dept: LAB | Age: 52
End: 2022-07-12
Attending: FAMILY MEDICINE
Payer: COMMERCIAL

## 2022-07-12 VITALS
OXYGEN SATURATION: 98 % | RESPIRATION RATE: 16 BRPM | DIASTOLIC BLOOD PRESSURE: 80 MMHG | SYSTOLIC BLOOD PRESSURE: 114 MMHG | HEART RATE: 82 BPM | BODY MASS INDEX: 26.5 KG/M2 | HEIGHT: 62 IN | WEIGHT: 144 LBS

## 2022-07-12 DIAGNOSIS — S29.9XXA TRAUMATIC INJURY OF RIB: Primary | ICD-10-CM

## 2022-07-12 DIAGNOSIS — R10.32 LEFT LOWER QUADRANT ABDOMINAL PAIN: ICD-10-CM

## 2022-07-12 DIAGNOSIS — C91.10 CLL (CHRONIC LYMPHOCYTIC LEUKEMIA) (HCC): ICD-10-CM

## 2022-07-12 LAB
ALBUMIN SERPL-MCNC: 3.9 G/DL (ref 3.4–5)
ALBUMIN/GLOB SERPL: 1.1 {RATIO} (ref 1–2)
ALP LIVER SERPL-CCNC: 98 U/L
ALT SERPL-CCNC: 49 U/L
ANION GAP SERPL CALC-SCNC: 4 MMOL/L (ref 0–18)
AST SERPL-CCNC: 21 U/L (ref 15–37)
BASOPHILS # BLD AUTO: 0.07 X10(3) UL (ref 0–0.2)
BASOPHILS NFR BLD AUTO: 0.4 %
BILIRUB SERPL-MCNC: 0.4 MG/DL (ref 0.1–2)
BILIRUB UR QL STRIP.AUTO: NEGATIVE
BUN BLD-MCNC: 18 MG/DL (ref 7–18)
CALCIUM BLD-MCNC: 9.5 MG/DL (ref 8.5–10.1)
CHLORIDE SERPL-SCNC: 106 MMOL/L (ref 98–112)
CLARITY UR REFRACT.AUTO: CLEAR
CO2 SERPL-SCNC: 28 MMOL/L (ref 21–32)
CREAT BLD-MCNC: 0.86 MG/DL
EOSINOPHIL # BLD AUTO: 0.28 X10(3) UL (ref 0–0.7)
EOSINOPHIL NFR BLD AUTO: 1.5 %
ERYTHROCYTE [DISTWIDTH] IN BLOOD BY AUTOMATED COUNT: 12.3 %
FASTING STATUS PATIENT QL REPORTED: YES
GLOBULIN PLAS-MCNC: 3.4 G/DL (ref 2.8–4.4)
GLUCOSE BLD-MCNC: 105 MG/DL (ref 70–99)
GLUCOSE UR STRIP.AUTO-MCNC: NEGATIVE MG/DL
HCT VFR BLD AUTO: 41 %
HGB BLD-MCNC: 13.2 G/DL
IMM GRANULOCYTES # BLD AUTO: 0.05 X10(3) UL (ref 0–1)
IMM GRANULOCYTES NFR BLD: 0.3 %
KETONES UR STRIP.AUTO-MCNC: NEGATIVE MG/DL
LDH SERPL L TO P-CCNC: 229 U/L
LEUKOCYTE ESTERASE UR QL STRIP.AUTO: NEGATIVE
LYMPHOCYTES # BLD AUTO: 11.75 X10(3) UL (ref 1–4)
LYMPHOCYTES NFR BLD AUTO: 65 %
MCH RBC QN AUTO: 29.7 PG (ref 26–34)
MCHC RBC AUTO-ENTMCNC: 32.2 G/DL (ref 31–37)
MCV RBC AUTO: 92.3 FL
MONOCYTES # BLD AUTO: 0.65 X10(3) UL (ref 0.1–1)
MONOCYTES NFR BLD AUTO: 3.6 %
NEUTROPHILS # BLD AUTO: 5.27 X10 (3) UL (ref 1.5–7.7)
NEUTROPHILS # BLD AUTO: 5.27 X10(3) UL (ref 1.5–7.7)
NEUTROPHILS NFR BLD AUTO: 29.2 %
NITRITE UR QL STRIP.AUTO: NEGATIVE
OSMOLALITY SERPL CALC.SUM OF ELEC: 288 MOSM/KG (ref 275–295)
PH UR STRIP.AUTO: 6 [PH] (ref 5–8)
PLATELET # BLD AUTO: 218 10(3)UL (ref 150–450)
POTASSIUM SERPL-SCNC: 4 MMOL/L (ref 3.5–5.1)
PROT SERPL-MCNC: 7.3 G/DL (ref 6.4–8.2)
PROT UR STRIP.AUTO-MCNC: NEGATIVE MG/DL
RBC # BLD AUTO: 4.44 X10(6)UL
SODIUM SERPL-SCNC: 138 MMOL/L (ref 136–145)
SP GR UR STRIP.AUTO: 1.01 (ref 1–1.03)
UROBILINOGEN UR STRIP.AUTO-MCNC: <2 MG/DL
WBC # BLD AUTO: 18.1 X10(3) UL (ref 4–11)

## 2022-07-12 PROCEDURE — 83615 LACTATE (LD) (LDH) ENZYME: CPT

## 2022-07-12 PROCEDURE — 80053 COMPREHEN METABOLIC PANEL: CPT

## 2022-07-12 PROCEDURE — 83521 IG LIGHT CHAINS FREE EACH: CPT

## 2022-07-12 PROCEDURE — 81001 URINALYSIS AUTO W/SCOPE: CPT

## 2022-07-12 PROCEDURE — 86334 IMMUNOFIX E-PHORESIS SERUM: CPT

## 2022-07-12 PROCEDURE — 85025 COMPLETE CBC W/AUTO DIFF WBC: CPT

## 2022-07-12 PROCEDURE — 82784 ASSAY IGA/IGD/IGG/IGM EACH: CPT

## 2022-07-12 PROCEDURE — 3079F DIAST BP 80-89 MM HG: CPT | Performed by: FAMILY MEDICINE

## 2022-07-12 PROCEDURE — 84165 PROTEIN E-PHORESIS SERUM: CPT

## 2022-07-12 PROCEDURE — 3008F BODY MASS INDEX DOCD: CPT | Performed by: FAMILY MEDICINE

## 2022-07-12 PROCEDURE — 36415 COLL VENOUS BLD VENIPUNCTURE: CPT

## 2022-07-12 PROCEDURE — 3074F SYST BP LT 130 MM HG: CPT | Performed by: FAMILY MEDICINE

## 2022-07-12 PROCEDURE — 99214 OFFICE O/P EST MOD 30 MIN: CPT | Performed by: FAMILY MEDICINE

## 2022-07-12 RX ORDER — HYDROCODONE BITARTRATE AND ACETAMINOPHEN 5; 325 MG/1; MG/1
1 TABLET ORAL EVERY 8 HOURS PRN
Qty: 30 TABLET | Refills: 0 | Status: SHIPPED | OUTPATIENT
Start: 2022-07-12

## 2022-07-12 RX ORDER — MULTIVIT-MIN/IRON FUM/FOLIC AC 7.5 MG-4
1 TABLET ORAL DAILY
COMMUNITY

## 2022-07-13 ENCOUNTER — APPOINTMENT (OUTPATIENT)
Dept: HEMATOLOGY/ONCOLOGY | Facility: HOSPITAL | Age: 52
End: 2022-07-13
Attending: INTERNAL MEDICINE
Payer: COMMERCIAL

## 2022-07-13 LAB
IGA SERPL-MCNC: 132 MG/DL (ref 70–312)
IGM SERPL-MCNC: 57.4 MG/DL (ref 43–279)
IMMUNOGLOBULIN PNL SER-MCNC: 847 MG/DL (ref 791–1643)

## 2022-07-14 ENCOUNTER — TELEPHONE (OUTPATIENT)
Dept: FAMILY MEDICINE CLINIC | Facility: CLINIC | Age: 52
End: 2022-07-14

## 2022-07-14 DIAGNOSIS — R31.9 HEMATURIA, UNSPECIFIED TYPE: Primary | ICD-10-CM
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
## 2022-07-14 DIAGNOSIS — R31.29 MICROSCOPIC HEMATURIA: ICD-10-CM

## 2022-07-15 ENCOUNTER — HOSPITAL ENCOUNTER (OUTPATIENT)
Dept: ULTRASOUND IMAGING | Age: 52
Discharge: HOME | End: 2022-07-15
Attending: FAMILY MEDICINE
Payer: COMMERCIAL

## 2022-07-15 DIAGNOSIS — R31.29 MICROSCOPIC HEMATURIA: ICD-10-CM
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
## 2022-07-15 LAB
ALBUMIN SERPL ELPH-MCNC: 4.68 G/DL (ref 3.75–5.21)
ALBUMIN/GLOB SERPL: 1.72 {RATIO} (ref 1–2)
ALPHA1 GLOB SERPL ELPH-MCNC: 0.35 G/DL (ref 0.19–0.46)
ALPHA2 GLOB SERPL ELPH-MCNC: 0.71 G/DL (ref 0.48–1.05)
B-GLOBULIN SERPL ELPH-MCNC: 0.84 G/DL (ref 0.68–1.23)
GAMMA GLOB SERPL ELPH-MCNC: 0.82 G/DL (ref 0.62–1.7)
KAPPA LC FREE SER-MCNC: 1.41 MG/DL (ref 0.33–1.94)
KAPPA LC FREE/LAMBDA FREE SER NEPH: 1.22 {RATIO} (ref 0.26–1.65)
LAMBDA LC FREE SERPL-MCNC: 1.15 MG/DL (ref 0.57–2.63)
PROT SERPL-MCNC: 7.4 G/DL (ref 6.4–8.2)

## 2022-07-15 PROCEDURE — 76775 US EXAM ABDO BACK WALL LIM: CPT | Performed by: FAMILY MEDICINE

## 2022-07-20 ENCOUNTER — OFFICE VISIT (OUTPATIENT)
Dept: HEMATOLOGY/ONCOLOGY | Facility: HOSPITAL | Age: 52
End: 2022-07-20
Attending: INTERNAL MEDICINE
Payer: COMMERCIAL

## 2022-07-20 VITALS
OXYGEN SATURATION: 95 % | WEIGHT: 143 LBS | RESPIRATION RATE: 18 BRPM | BODY MASS INDEX: 26 KG/M2 | TEMPERATURE: 99 F | HEART RATE: 76 BPM | DIASTOLIC BLOOD PRESSURE: 98 MMHG | SYSTOLIC BLOOD PRESSURE: 150 MMHG

## 2022-07-20 DIAGNOSIS — C91.10 CLL (CHRONIC LYMPHOCYTIC LEUKEMIA) (HCC): Primary | ICD-10-CM

## 2022-07-20 PROCEDURE — 99213 OFFICE O/P EST LOW 20 MIN: CPT | Performed by: INTERNAL MEDICINE

## 2022-07-20 NOTE — PROGRESS NOTES
Education Record    Learner:  Patient    Disease / Dorian John visit CLL    Barriers / Limitations:  None   Comments:    Method:  Discussion   Comments:    General Topics:  Plan of care reviewed   Comments:    Outcome:  Shows understanding   Comments:    She did have a recent accident, injured her ribs and kidney area. Still with discomfort. Takes norco daily. Did have blood work after the injury.

## 2022-07-25 DIAGNOSIS — E78.00 ELEVATED CHOLESTEROL: ICD-10-CM

## 2022-07-25 DIAGNOSIS — Z82.49 FAMILY HISTORY OF EARLY CAD: ICD-10-CM

## 2022-07-25 RX ORDER — ROSUVASTATIN CALCIUM 5 MG/1
5 TABLET, COATED ORAL NIGHTLY
Qty: 90 TABLET | Refills: 0 | Status: SHIPPED | OUTPATIENT
Start: 2022-07-25

## 2022-08-03 ENCOUNTER — TELEPHONE (OUTPATIENT)
Dept: FAMILY MEDICINE CLINIC | Facility: CLINIC | Age: 52
End: 2022-08-03

## 2022-08-03 DIAGNOSIS — S37.002D INJURY OF LEFT KIDNEY, SUBSEQUENT ENCOUNTER: ICD-10-CM
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
## 2022-08-03 NOTE — TELEPHONE ENCOUNTER
See previous message. Pt had appt 7/12/22 due to a fall. Pt still c/o pain to left lower rib and flank area. Pt states she would like a CT scan. Pt feels it is not healing. Please advise. Pt had US 7/15/22 which showed kidney contusion.

## 2022-08-03 NOTE — TELEPHONE ENCOUNTER
Patient had a fall and wanted to wait to do a CT as YP had suggested. She is still in quite a bit of pain and would like to now pursue that option. I told her YP was not back in until tomorrow.

## 2022-08-18 ENCOUNTER — HOSPITAL ENCOUNTER (OUTPATIENT)
Dept: CT IMAGING | Age: 52
Discharge: HOME | End: 2022-08-18
Attending: FAMILY MEDICINE
Payer: COMMERCIAL

## 2022-08-18 DIAGNOSIS — S37.002D INJURY OF LEFT KIDNEY, SUBSEQUENT ENCOUNTER: ICD-10-CM
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
## 2022-08-18 PROCEDURE — 82565 ASSAY OF CREATININE: CPT

## 2022-08-18 PROCEDURE — 74177 CT ABD & PELVIS W/CONTRAST: CPT | Performed by: FAMILY MEDICINE

## 2022-08-18 RX ORDER — IOHEXOL 350 MG/ML
100 INJECTION, SOLUTION INTRAVENOUS
Status: COMPLETED | OUTPATIENT
Start: 2022-08-18 | End: 2022-08-18

## 2022-08-18 RX ADMIN — IOHEXOL 100 ML: 350 INJECTION, SOLUTION INTRAVENOUS at 07:29:00

## 2022-08-19 LAB
CREAT BLD-MCNC: 0.9 MG/DL
GFR SERPLBLD BASED ON 1.73 SQ M-ARVRAT: 78 ML/MIN/1.73M2 (ref 60–?)

## 2022-10-22 DIAGNOSIS — E78.00 ELEVATED CHOLESTEROL: ICD-10-CM

## 2022-10-22 DIAGNOSIS — Z82.49 FAMILY HISTORY OF EARLY CAD: ICD-10-CM

## 2022-10-24 RX ORDER — ROSUVASTATIN CALCIUM 5 MG/1
TABLET, COATED ORAL
Qty: 90 TABLET | Refills: 0 | Status: SHIPPED | OUTPATIENT
Start: 2022-10-24

## 2022-12-21 ENCOUNTER — OFFICE VISIT (OUTPATIENT)
Dept: FAMILY MEDICINE CLINIC | Facility: CLINIC | Age: 52
End: 2022-12-21
Payer: COMMERCIAL

## 2022-12-21 VITALS
RESPIRATION RATE: 16 BRPM | BODY MASS INDEX: 26.87 KG/M2 | WEIGHT: 146 LBS | OXYGEN SATURATION: 99 % | DIASTOLIC BLOOD PRESSURE: 86 MMHG | HEIGHT: 62 IN | HEART RATE: 81 BPM | SYSTOLIC BLOOD PRESSURE: 114 MMHG

## 2022-12-21 DIAGNOSIS — Z12.31 ENCOUNTER FOR SCREENING MAMMOGRAM FOR HIGH-RISK PATIENT: ICD-10-CM

## 2022-12-21 DIAGNOSIS — Z00.00 ANNUAL PHYSICAL EXAM: Primary | ICD-10-CM

## 2022-12-21 PROCEDURE — 3074F SYST BP LT 130 MM HG: CPT | Performed by: FAMILY MEDICINE

## 2022-12-21 PROCEDURE — 99396 PREV VISIT EST AGE 40-64: CPT | Performed by: FAMILY MEDICINE

## 2022-12-21 PROCEDURE — 3008F BODY MASS INDEX DOCD: CPT | Performed by: FAMILY MEDICINE

## 2022-12-21 PROCEDURE — 3079F DIAST BP 80-89 MM HG: CPT | Performed by: FAMILY MEDICINE

## 2022-12-21 RX ORDER — FLUTICASONE PROPIONATE 50 MCG
2 SPRAY, SUSPENSION (ML) NASAL NIGHTLY
Qty: 3 EACH | Refills: 0 | Status: SHIPPED | OUTPATIENT
Start: 2022-12-21

## 2023-01-13 ENCOUNTER — HOSPITAL ENCOUNTER (OUTPATIENT)
Dept: MAMMOGRAPHY | Age: 53
Discharge: HOME | End: 2023-01-13
Attending: FAMILY MEDICINE
Payer: COMMERCIAL

## 2023-01-13 ENCOUNTER — LAB ENCOUNTER (OUTPATIENT)
Dept: LAB | Age: 53
End: 2023-01-13
Attending: FAMILY MEDICINE
Payer: COMMERCIAL

## 2023-01-13 DIAGNOSIS — Z00.00 ANNUAL PHYSICAL EXAM: ICD-10-CM

## 2023-01-13 DIAGNOSIS — C91.10 CLL (CHRONIC LYMPHOCYTIC LEUKEMIA) (HCC): ICD-10-CM

## 2023-01-13 DIAGNOSIS — Z12.31 ENCOUNTER FOR SCREENING MAMMOGRAM FOR HIGH-RISK PATIENT: ICD-10-CM

## 2023-01-13 LAB
ALBUMIN SERPL-MCNC: 3.9 G/DL (ref 3.4–5)
ALBUMIN/GLOB SERPL: 1.1 {RATIO} (ref 1–2)
ALP LIVER SERPL-CCNC: 89 U/L
ALT SERPL-CCNC: 33 U/L
ANION GAP SERPL CALC-SCNC: 6 MMOL/L (ref 0–18)
AST SERPL-CCNC: 19 U/L (ref 15–37)
BASOPHILS # BLD AUTO: 0.06 X10(3) UL (ref 0–0.2)
BASOPHILS NFR BLD AUTO: 0.4 %
BILIRUB SERPL-MCNC: 0.8 MG/DL (ref 0.1–2)
BUN BLD-MCNC: 14 MG/DL (ref 7–18)
CALCIUM BLD-MCNC: 10.1 MG/DL (ref 8.5–10.1)
CHLORIDE SERPL-SCNC: 108 MMOL/L (ref 98–112)
CHOLEST SERPL-MCNC: 210 MG/DL (ref ?–200)
CO2 SERPL-SCNC: 29 MMOL/L (ref 21–32)
CREAT BLD-MCNC: 0.91 MG/DL
EOSINOPHIL # BLD AUTO: 0.23 X10(3) UL (ref 0–0.7)
EOSINOPHIL NFR BLD AUTO: 1.4 %
ERYTHROCYTE [DISTWIDTH] IN BLOOD BY AUTOMATED COUNT: 12.2 %
FASTING PATIENT LIPID ANSWER: YES
FASTING STATUS PATIENT QL REPORTED: YES
GFR SERPLBLD BASED ON 1.73 SQ M-ARVRAT: 76 ML/MIN/1.73M2 (ref 60–?)
GLOBULIN PLAS-MCNC: 3.6 G/DL (ref 2.8–4.4)
GLUCOSE BLD-MCNC: 100 MG/DL (ref 70–99)
HCT VFR BLD AUTO: 45.5 %
HDLC SERPL-MCNC: 43 MG/DL (ref 40–59)
HGB BLD-MCNC: 14.9 G/DL
IMM GRANULOCYTES # BLD AUTO: 0.02 X10(3) UL (ref 0–1)
IMM GRANULOCYTES NFR BLD: 0.1 %
LDLC SERPL CALC-MCNC: 134 MG/DL (ref ?–100)
LYMPHOCYTES # BLD AUTO: 11.48 X10(3) UL (ref 1–4)
LYMPHOCYTES NFR BLD AUTO: 70.7 %
MCH RBC QN AUTO: 29.5 PG (ref 26–34)
MCHC RBC AUTO-ENTMCNC: 32.7 G/DL (ref 31–37)
MCV RBC AUTO: 90.1 FL
MONOCYTES # BLD AUTO: 0.7 X10(3) UL (ref 0.1–1)
MONOCYTES NFR BLD AUTO: 4.3 %
NEUTROPHILS # BLD AUTO: 3.75 X10 (3) UL (ref 1.5–7.7)
NEUTROPHILS # BLD AUTO: 3.75 X10(3) UL (ref 1.5–7.7)
NEUTROPHILS NFR BLD AUTO: 23.1 %
NONHDLC SERPL-MCNC: 167 MG/DL (ref ?–130)
OSMOLALITY SERPL CALC.SUM OF ELEC: 297 MOSM/KG (ref 275–295)
PLATELET # BLD AUTO: 191 10(3)UL (ref 150–450)
POTASSIUM SERPL-SCNC: 4.1 MMOL/L (ref 3.5–5.1)
PROT SERPL-MCNC: 7.5 G/DL (ref 6.4–8.2)
RBC # BLD AUTO: 5.05 X10(6)UL
SODIUM SERPL-SCNC: 143 MMOL/L (ref 136–145)
T4 FREE SERPL-MCNC: 1 NG/DL (ref 0.8–1.7)
TRIGL SERPL-MCNC: 184 MG/DL (ref 30–149)
TSI SER-ACNC: 1.09 MIU/ML (ref 0.36–3.74)
VIT B12 SERPL-MCNC: 424 PG/ML (ref 193–986)
VIT D+METAB SERPL-MCNC: 29.4 NG/ML (ref 30–100)
VLDLC SERPL CALC-MCNC: 34 MG/DL (ref 0–30)
WBC # BLD AUTO: 16.2 X10(3) UL (ref 4–11)

## 2023-01-13 PROCEDURE — 84443 ASSAY THYROID STIM HORMONE: CPT | Performed by: FAMILY MEDICINE

## 2023-01-13 PROCEDURE — 77067 SCR MAMMO BI INCL CAD: CPT | Performed by: FAMILY MEDICINE

## 2023-01-13 PROCEDURE — 80053 COMPREHEN METABOLIC PANEL: CPT | Performed by: FAMILY MEDICINE

## 2023-01-13 PROCEDURE — 84439 ASSAY OF FREE THYROXINE: CPT | Performed by: FAMILY MEDICINE

## 2023-01-13 PROCEDURE — 80061 LIPID PANEL: CPT | Performed by: FAMILY MEDICINE

## 2023-01-13 PROCEDURE — 82306 VITAMIN D 25 HYDROXY: CPT | Performed by: FAMILY MEDICINE

## 2023-01-13 PROCEDURE — 77063 BREAST TOMOSYNTHESIS BI: CPT | Performed by: FAMILY MEDICINE

## 2023-01-13 PROCEDURE — 82607 VITAMIN B-12: CPT | Performed by: FAMILY MEDICINE

## 2023-01-19 DIAGNOSIS — Z82.49 FAMILY HISTORY OF EARLY CAD: ICD-10-CM

## 2023-01-19 DIAGNOSIS — E78.00 ELEVATED CHOLESTEROL: ICD-10-CM

## 2023-01-19 RX ORDER — ROSUVASTATIN CALCIUM 5 MG/1
TABLET, COATED ORAL
Qty: 90 TABLET | Refills: 0 | Status: SHIPPED | OUTPATIENT
Start: 2023-01-19

## 2023-01-27 DIAGNOSIS — E78.9 SERUM CHOLESTEROL ELEVATED: ICD-10-CM

## 2023-01-27 DIAGNOSIS — E53.9 VITAMIN B DEFICIENCY: ICD-10-CM

## 2023-01-27 DIAGNOSIS — E55.9 VITAMIN D DEFICIENCY: ICD-10-CM

## 2023-01-27 DIAGNOSIS — R73.9 BLOOD GLUCOSE ELEVATED: Primary | ICD-10-CM

## 2023-06-22 ENCOUNTER — LAB ENCOUNTER (OUTPATIENT)
Dept: LAB | Age: 53
End: 2023-06-22
Attending: FAMILY MEDICINE
Payer: COMMERCIAL

## 2023-06-22 DIAGNOSIS — E78.9 SERUM CHOLESTEROL ELEVATED: ICD-10-CM

## 2023-06-22 DIAGNOSIS — E55.9 VITAMIN D DEFICIENCY: ICD-10-CM

## 2023-06-22 DIAGNOSIS — E53.9 VITAMIN B DEFICIENCY: ICD-10-CM

## 2023-06-22 DIAGNOSIS — R73.9 BLOOD GLUCOSE ELEVATED: ICD-10-CM

## 2023-06-22 LAB
ALBUMIN SERPL-MCNC: 3.7 G/DL (ref 3.4–5)
ALBUMIN/GLOB SERPL: 1 {RATIO} (ref 1–2)
ALP LIVER SERPL-CCNC: 89 U/L
ALT SERPL-CCNC: 36 U/L
ANION GAP SERPL CALC-SCNC: 6 MMOL/L (ref 0–18)
AST SERPL-CCNC: 19 U/L (ref 15–37)
BILIRUB SERPL-MCNC: 0.5 MG/DL (ref 0.1–2)
BUN BLD-MCNC: 15 MG/DL (ref 7–18)
CALCIUM BLD-MCNC: 9.6 MG/DL (ref 8.5–10.1)
CHLORIDE SERPL-SCNC: 106 MMOL/L (ref 98–112)
CHOLEST SERPL-MCNC: 190 MG/DL (ref ?–200)
CO2 SERPL-SCNC: 28 MMOL/L (ref 21–32)
CREAT BLD-MCNC: 1.04 MG/DL
EST. AVERAGE GLUCOSE BLD GHB EST-MCNC: 126 MG/DL (ref 68–126)
FASTING PATIENT LIPID ANSWER: YES
FASTING STATUS PATIENT QL REPORTED: YES
GFR SERPLBLD BASED ON 1.73 SQ M-ARVRAT: 65 ML/MIN/1.73M2 (ref 60–?)
GLOBULIN PLAS-MCNC: 3.8 G/DL (ref 2.8–4.4)
GLUCOSE BLD-MCNC: 97 MG/DL (ref 70–99)
HBA1C MFR BLD: 6 % (ref ?–5.7)
HDLC SERPL-MCNC: 42 MG/DL (ref 40–59)
LDLC SERPL CALC-MCNC: 103 MG/DL (ref ?–100)
NONHDLC SERPL-MCNC: 148 MG/DL (ref ?–130)
OSMOLALITY SERPL CALC.SUM OF ELEC: 291 MOSM/KG (ref 275–295)
POTASSIUM SERPL-SCNC: 4.1 MMOL/L (ref 3.5–5.1)
PROT SERPL-MCNC: 7.5 G/DL (ref 6.4–8.2)
SODIUM SERPL-SCNC: 140 MMOL/L (ref 136–145)
TRIGL SERPL-MCNC: 261 MG/DL (ref 30–149)
VIT B12 SERPL-MCNC: 449 PG/ML (ref 193–986)
VIT D+METAB SERPL-MCNC: 32.3 NG/ML (ref 30–100)
VLDLC SERPL CALC-MCNC: 44 MG/DL (ref 0–30)

## 2023-06-22 PROCEDURE — 82607 VITAMIN B-12: CPT | Performed by: FAMILY MEDICINE

## 2023-06-22 PROCEDURE — 82306 VITAMIN D 25 HYDROXY: CPT | Performed by: FAMILY MEDICINE

## 2023-06-22 PROCEDURE — 80061 LIPID PANEL: CPT | Performed by: FAMILY MEDICINE

## 2023-06-22 PROCEDURE — 80053 COMPREHEN METABOLIC PANEL: CPT | Performed by: FAMILY MEDICINE

## 2023-06-22 PROCEDURE — 83036 HEMOGLOBIN GLYCOSYLATED A1C: CPT | Performed by: FAMILY MEDICINE

## 2023-07-15 ENCOUNTER — LAB ENCOUNTER (OUTPATIENT)
Dept: LAB | Age: 53
End: 2023-07-15
Attending: INTERNAL MEDICINE
Payer: COMMERCIAL

## 2023-07-15 DIAGNOSIS — C91.10 CLL (CHRONIC LYMPHOCYTIC LEUKEMIA) (HCC): ICD-10-CM

## 2023-07-15 LAB
ALBUMIN SERPL-MCNC: 4.1 G/DL (ref 3.4–5)
ALBUMIN/GLOB SERPL: 1.3 {RATIO} (ref 1–2)
ALP LIVER SERPL-CCNC: 76 U/L
ALT SERPL-CCNC: 38 U/L
ANION GAP SERPL CALC-SCNC: 4 MMOL/L (ref 0–18)
AST SERPL-CCNC: 22 U/L (ref 15–37)
BASOPHILS # BLD AUTO: 0.05 X10(3) UL (ref 0–0.2)
BASOPHILS NFR BLD AUTO: 0.3 %
BILIRUB SERPL-MCNC: 0.5 MG/DL (ref 0.1–2)
BUN BLD-MCNC: 17 MG/DL (ref 7–18)
CALCIUM BLD-MCNC: 9.7 MG/DL (ref 8.5–10.1)
CHLORIDE SERPL-SCNC: 106 MMOL/L (ref 98–112)
CO2 SERPL-SCNC: 28 MMOL/L (ref 21–32)
CREAT BLD-MCNC: 0.75 MG/DL
EOSINOPHIL # BLD AUTO: 0.19 X10(3) UL (ref 0–0.7)
EOSINOPHIL NFR BLD AUTO: 1.2 %
ERYTHROCYTE [DISTWIDTH] IN BLOOD BY AUTOMATED COUNT: 12.2 %
FASTING STATUS PATIENT QL REPORTED: NO
GFR SERPLBLD BASED ON 1.73 SQ M-ARVRAT: 96 ML/MIN/1.73M2 (ref 60–?)
GLOBULIN PLAS-MCNC: 3.1 G/DL (ref 2.8–4.4)
GLUCOSE BLD-MCNC: 102 MG/DL (ref 70–99)
HCT VFR BLD AUTO: 44.3 %
HGB BLD-MCNC: 14.6 G/DL
IGA SERPL-MCNC: 118 MG/DL (ref 70–312)
IGM SERPL-MCNC: 56.7 MG/DL (ref 43–279)
IMM GRANULOCYTES # BLD AUTO: 0.03 X10(3) UL (ref 0–1)
IMM GRANULOCYTES NFR BLD: 0.2 %
IMMUNOGLOBULIN PNL SER-MCNC: 892 MG/DL (ref 791–1643)
LDH SERPL L TO P-CCNC: 193 U/L
LYMPHOCYTES # BLD AUTO: 11.69 X10(3) UL (ref 1–4)
LYMPHOCYTES NFR BLD AUTO: 73.6 %
MCH RBC QN AUTO: 29.3 PG (ref 26–34)
MCHC RBC AUTO-ENTMCNC: 33 G/DL (ref 31–37)
MCV RBC AUTO: 89 FL
MONOCYTES # BLD AUTO: 0.58 X10(3) UL (ref 0.1–1)
MONOCYTES NFR BLD AUTO: 3.7 %
NEUTROPHILS # BLD AUTO: 3.35 X10 (3) UL (ref 1.5–7.7)
NEUTROPHILS # BLD AUTO: 3.35 X10(3) UL (ref 1.5–7.7)
NEUTROPHILS NFR BLD AUTO: 21 %
OSMOLALITY SERPL CALC.SUM OF ELEC: 288 MOSM/KG (ref 275–295)
PLATELET # BLD AUTO: 178 10(3)UL (ref 150–450)
POTASSIUM SERPL-SCNC: 4.3 MMOL/L (ref 3.5–5.1)
PROT SERPL-MCNC: 7.2 G/DL (ref 6.4–8.2)
RBC # BLD AUTO: 4.98 X10(6)UL
SODIUM SERPL-SCNC: 138 MMOL/L (ref 136–145)
WBC # BLD AUTO: 15.9 X10(3) UL (ref 4–11)

## 2023-07-15 PROCEDURE — 83615 LACTATE (LD) (LDH) ENZYME: CPT

## 2023-07-15 PROCEDURE — 85025 COMPLETE CBC W/AUTO DIFF WBC: CPT

## 2023-07-15 PROCEDURE — 36415 COLL VENOUS BLD VENIPUNCTURE: CPT

## 2023-07-15 PROCEDURE — 80053 COMPREHEN METABOLIC PANEL: CPT

## 2023-07-15 PROCEDURE — 82784 ASSAY IGA/IGD/IGG/IGM EACH: CPT

## 2023-07-20 DIAGNOSIS — E78.00 ELEVATED CHOLESTEROL: ICD-10-CM

## 2023-07-20 DIAGNOSIS — Z82.49 FAMILY HISTORY OF EARLY CAD: ICD-10-CM

## 2023-07-20 RX ORDER — ROSUVASTATIN CALCIUM 5 MG/1
5 TABLET, COATED ORAL NIGHTLY
Qty: 90 TABLET | Refills: 0 | Status: SHIPPED | OUTPATIENT
Start: 2023-07-20

## 2023-07-20 NOTE — TELEPHONE ENCOUNTER
A refill request was received for:  Requested Prescriptions     Pending Prescriptions Disp Refills    ROSUVASTATIN 5 MG Oral Tab [Pharmacy Med Name: ROSUVASTATIN 5MG TABLETS] 90 tablet 0     Sig: TAKE 1 TABLET(5 MG) BY MOUTH EVERY NIGHT         Component  Ref Range & Units 6/22/23  7:03 AM   Cholesterol, Total  <200 mg/dL 190   Comment: Desirable  <200 mg/dL  Borderline  200-239 mg/dL  High      >=240 mg/dL     HDL Cholesterol  40 - 59 mg/dL 42   Comment: Interpretive Information:  An HDL cholesterol <40 mg/dL is low and constitutes a coronary heart disease risk factor. An HDL cholesterol >60 mg/dL is a negative risk factor for coronary heart disease.      Triglycerides  30 - 149 mg/dL 261 High    Comment: Reference interval for fasting triglycerides  Desirable: <150 mg/dL  Borderline: 150-199 mg/dL  High: 200-499 mg/dL  Very High: >=500 mg/dL       LDL Cholesterol  <100 mg/dL 103 High    Comment: Desirable <100 mg/dL  Borderline 100-129 mg/dL  High     >=130mg/dL     VLDL  0 - 30 mg/dL 44 High    Non HDL Chol  <130 mg/dL 148          Last refill date:1/19/2023       Last office visit:12/21/2022     Follow up due:  Future Appointments   Date Time Provider Renuka Fernandez   8/29/2023  7:30 AM Ivette Scanlon DO [facility] [address] [facility] [address]

## 2023-08-29 ENCOUNTER — OFFICE VISIT (OUTPATIENT)
Dept: FAMILY MEDICINE CLINIC | Facility: CLINIC | Age: 53
End: 2023-08-29
Payer: COMMERCIAL

## 2023-08-29 VITALS
RESPIRATION RATE: 16 BRPM | DIASTOLIC BLOOD PRESSURE: 82 MMHG | HEART RATE: 84 BPM | SYSTOLIC BLOOD PRESSURE: 126 MMHG | OXYGEN SATURATION: 98 % | HEIGHT: 62 IN | BODY MASS INDEX: 26.31 KG/M2 | WEIGHT: 143 LBS

## 2023-08-29 DIAGNOSIS — E78.00 ELEVATED CHOLESTEROL: ICD-10-CM

## 2023-08-29 DIAGNOSIS — R73.9 HYPERGLYCEMIA: Primary | ICD-10-CM

## 2023-08-29 PROCEDURE — 3079F DIAST BP 80-89 MM HG: CPT | Performed by: FAMILY MEDICINE

## 2023-08-29 PROCEDURE — 3008F BODY MASS INDEX DOCD: CPT | Performed by: FAMILY MEDICINE

## 2023-08-29 PROCEDURE — 3074F SYST BP LT 130 MM HG: CPT | Performed by: FAMILY MEDICINE

## 2023-08-29 PROCEDURE — 90750 HZV VACC RECOMBINANT IM: CPT | Performed by: FAMILY MEDICINE

## 2023-08-29 PROCEDURE — 99213 OFFICE O/P EST LOW 20 MIN: CPT | Performed by: FAMILY MEDICINE

## 2023-08-29 PROCEDURE — 90471 IMMUNIZATION ADMIN: CPT | Performed by: FAMILY MEDICINE

## 2023-10-10 DIAGNOSIS — E78.00 ELEVATED CHOLESTEROL: ICD-10-CM

## 2023-10-10 DIAGNOSIS — Z82.49 FAMILY HISTORY OF EARLY CAD: ICD-10-CM

## 2023-10-10 RX ORDER — ROSUVASTATIN CALCIUM 5 MG/1
5 TABLET, COATED ORAL NIGHTLY
Qty: 90 TABLET | Refills: 0 | Status: SHIPPED | OUTPATIENT
Start: 2023-10-10

## 2023-11-10 ENCOUNTER — TELEPHONE (OUTPATIENT)
Dept: HEMATOLOGY/ONCOLOGY | Facility: HOSPITAL | Age: 53
End: 2023-11-10

## 2023-11-10 NOTE — TELEPHONE ENCOUNTER
Patient asking when her last follow up was with Dr Anson Joe. Informed patient she was due back in July and will need follow up w/labs. Appt scheduled.

## 2023-11-11 ENCOUNTER — HOSPITAL ENCOUNTER (OUTPATIENT)
Dept: CT IMAGING | Age: 53
Discharge: HOME | End: 2023-11-11
Attending: FAMILY MEDICINE

## 2023-11-11 DIAGNOSIS — Z13.6 SCREENING FOR HEART DISEASE: ICD-10-CM

## 2023-12-05 ENCOUNTER — APPOINTMENT (OUTPATIENT)
Dept: HEMATOLOGY/ONCOLOGY | Facility: HOSPITAL | Age: 53
End: 2023-12-05
Attending: INTERNAL MEDICINE
Payer: COMMERCIAL

## 2024-01-04 DIAGNOSIS — C91.10 CLL (CHRONIC LYMPHOCYTIC LEUKEMIA) (HCC): Primary | ICD-10-CM

## 2024-01-05 ENCOUNTER — LAB ENCOUNTER (OUTPATIENT)
Dept: LAB | Age: 54
End: 2024-01-05
Attending: INTERNAL MEDICINE
Payer: COMMERCIAL

## 2024-01-05 DIAGNOSIS — C91.10 CLL (CHRONIC LYMPHOCYTIC LEUKEMIA) (HCC): ICD-10-CM

## 2024-01-05 LAB
BASOPHILS # BLD AUTO: 0.07 X10(3) UL (ref 0–0.2)
BASOPHILS NFR BLD AUTO: 0.3 %
EOSINOPHIL # BLD AUTO: 0.26 X10(3) UL (ref 0–0.7)
EOSINOPHIL NFR BLD AUTO: 1.3 %
ERYTHROCYTE [DISTWIDTH] IN BLOOD BY AUTOMATED COUNT: 11.9 %
HCT VFR BLD AUTO: 41.7 %
HGB BLD-MCNC: 14.3 G/DL
IMM GRANULOCYTES # BLD AUTO: 0.06 X10(3) UL (ref 0–1)
IMM GRANULOCYTES NFR BLD: 0.3 %
LYMPHOCYTES # BLD AUTO: 13.3 X10(3) UL (ref 1–4)
LYMPHOCYTES NFR BLD AUTO: 65.5 %
MCH RBC QN AUTO: 29.8 PG (ref 26–34)
MCHC RBC AUTO-ENTMCNC: 34.3 G/DL (ref 31–37)
MCV RBC AUTO: 86.9 FL
MONOCYTES # BLD AUTO: 0.89 X10(3) UL (ref 0.1–1)
MONOCYTES NFR BLD AUTO: 4.4 %
MORPHOLOGY: NORMAL
NEUTROPHILS # BLD AUTO: 5.74 X10 (3) UL (ref 1.5–7.7)
NEUTROPHILS # BLD AUTO: 5.74 X10(3) UL (ref 1.5–7.7)
NEUTROPHILS NFR BLD AUTO: 28.2 %
PLATELET # BLD AUTO: 198 10(3)UL (ref 150–450)
PLATELET MORPHOLOGY: NORMAL
RBC # BLD AUTO: 4.8 X10(6)UL
WBC # BLD AUTO: 20.3 X10(3) UL (ref 4–11)

## 2024-01-05 PROCEDURE — 85025 COMPLETE CBC W/AUTO DIFF WBC: CPT

## 2024-01-05 PROCEDURE — 36415 COLL VENOUS BLD VENIPUNCTURE: CPT

## 2024-01-08 NOTE — PROGRESS NOTES
[facility] Progress Note      Patient Name:  Nanette Hennessy  YOB: 1970  Medical Record Number:  [id number]    Date of visit: 2024      CHIEF COMPLAINT: Stage 0 CLL.     HPI:     53 year old that I initially saw on 18 after she underwent workup of fullness in her left neck.  Ultrasound 18-normal lymph nodes.  Labs showed a lymphocytosis.  Additional workup showed evidence of CLL.  Since then she has been followed by surveillance.  Presents for evaluation.      Last seen .  No fevers, chills, night sweats.  Has chronic postnasal drip and just darted Flonase.  No change in lymphadenopathy.      SOCIAL HISTORY:    , works in corporate office of [employer]  3 children-20, 18, 14.    ROS:     Constitutional: Fatigue.  Neurologic: no headache, seizures, diplopia or weakness  Respiratory: LANDEROS.  Cardiovascular: LANDEROS  GI: no nausea, vomiting, diarrhea or BRBPR  Musculoskeletal: no body-ache or joint pain  Dermatologic: no alopecia, rash, pruritis  : no hematuria, dysuria or frequency  Psych: no confusion or depression   Heme: no easy bruising or bleeding     ALLERGIES:  No Known Allergies    MEDICATIONS:      Current Outpatient Medications   Medication Sig Dispense Refill    rosuvastatin 5 MG Oral Tab Take 1 tablet (5 mg total) by mouth nightly. 90 tablet 0    fluticasone propionate 50 MCG/ACT Nasal Suspension 2 sprays by Nasal route nightly. 3 each 0    Multiple Vitamins-Minerals (MULTI-VITAMIN/MINERALS) Oral Tab Take 1 tablet by mouth daily.      Cholecalciferol (VITAMIN D3) 2000 units Oral Tab Take by mouth.         VITALS:  Height: 157.5 cm (5' 2.01\") (1428)  Weight: 65.4 kg (144 lb 3.2 oz) (1428)  BSA (Calculated - sq m): 1.66 sq meters (1428)  Pulse: 78 (1428)  BP: 143/87 (1428)  Temp: 97.7 °F (36.5 °C) (1428)  Do Not Use - Resp Rate: --  SpO2: 95 % (1428)    PS:  ECO    PHYSICAL EXAM:    General: alert and  oriented x 3, not in acute distress.  HEENT: ZINA, oropharynx  clear.  Neck: supple.  Intimal fullness left anterior triangle, no discretely enlarged lymph nodes.    CVS: S1S2, regular  Rhythm, no murmurs.   Lungs: Clear to auscultation and percussion.  Abdomen: Soft, non tender, no hepato-splenomegaly.  Extremities:  No edema.  CNS: no focal deficit    Emotional well being: Patient's emotional well being was assessed.  No issues requiring acute psychosocial intervention were identified.     PATHOLOGY:    IGHV Mutation Analysis by Sequencing Mutated    ABNORMAL FISH RESULT 13q14 ([id number]): deletion present     2% of the B-cells (CD19 positive) are ZAP70 positive.     LABS:     No results found for this or any previous visit (from the past 24 hour(s)).    ASSESSMENT AND PLAN:     #  CLL:  IPI 0, low risk.  ( Carmona Stage 0, 13q-, ZAP 70 negative, IGVH mutated): Diagnosed 1/19.  No B type symptoms or high risk features. Counts stable. Seen at  for second opinion and observation amanda as well.      Today, WBC is a bit higher.  Not concerning and could be related to recent start of Flonase.  Repeat labs in 6 months as discussed.  Should call for concerning symptoms.      ORDERS PLACED:      Return for Labs 6 months. Labs, MD 12 months.    Olga Perez M.D.    [facility]    [facility]  [address] [place] [address] [place], [address]    1/9/2024

## 2024-01-09 ENCOUNTER — OFFICE VISIT (OUTPATIENT)
Dept: HEMATOLOGY/ONCOLOGY | Facility: HOSPITAL | Age: 54
End: 2024-01-09
Attending: INTERNAL MEDICINE
Payer: COMMERCIAL

## 2024-01-09 VITALS
SYSTOLIC BLOOD PRESSURE: 143 MMHG | RESPIRATION RATE: 14 BRPM | HEIGHT: 62.01 IN | WEIGHT: 144.19 LBS | OXYGEN SATURATION: 95 % | HEART RATE: 78 BPM | DIASTOLIC BLOOD PRESSURE: 87 MMHG | TEMPERATURE: 98 F | BODY MASS INDEX: 26.2 KG/M2

## 2024-01-09 DIAGNOSIS — C91.10 CLL (CHRONIC LYMPHOCYTIC LEUKEMIA) (HCC): Primary | ICD-10-CM

## 2024-01-09 PROCEDURE — 99213 OFFICE O/P EST LOW 20 MIN: CPT | Performed by: INTERNAL MEDICINE

## 2024-01-09 NOTE — PROGRESS NOTES
Education Record    Learner:  Patient    Disease / Diagnosis: stage 0 CLL    Barriers / Limitations:  None   Comments:    Method:  Discussion   Comments:    General Topics:  Plan of care reviewed   Comments:    Outcome:  Shows understanding   Comments:   Pt feeling well.   No frequent infections.   Reports she does bruise easily.

## 2024-01-23 ENCOUNTER — TELEMEDICINE (OUTPATIENT)
Dept: FAMILY MEDICINE CLINIC | Facility: CLINIC | Age: 54
End: 2024-01-23
Payer: COMMERCIAL

## 2024-01-23 DIAGNOSIS — R09.81 NASAL CONGESTION: ICD-10-CM

## 2024-01-23 DIAGNOSIS — R05.1 ACUTE COUGH: Primary | ICD-10-CM

## 2024-01-23 PROCEDURE — 99213 OFFICE O/P EST LOW 20 MIN: CPT | Performed by: FAMILY MEDICINE

## 2024-01-23 RX ORDER — BENZONATATE 200 MG/1
200 CAPSULE ORAL EVERY 8 HOURS PRN
Qty: 30 CAPSULE | Refills: 0 | Status: SHIPPED | OUTPATIENT
Start: 2024-01-23

## 2024-01-23 RX ORDER — PREDNISONE 20 MG/1
TABLET ORAL
Qty: 13 TABLET | Refills: 0 | Status: SHIPPED | OUTPATIENT
Start: 2024-01-23

## 2024-01-23 RX ORDER — ALBUTEROL SULFATE 90 UG/1
2 AEROSOL, METERED RESPIRATORY (INHALATION) EVERY 6 HOURS PRN
Qty: 1 EACH | Refills: 1 | Status: SHIPPED | OUTPATIENT
Start: 2024-01-23

## 2024-01-23 NOTE — PROGRESS NOTES
Subjective:   Patient ID: Nanette Hennessy is a 53 year old female.    Started feeling sick 4 days ago.  Tightness in airways, hurts with coughing.  No F/C/sweats.  + wheezing.  At times bronchospasm.  Mild sputum.  + sore throat, sinus pain, rhinorrhea.  + mild HA.  No bodyaches or fatigue.  Daughter is sick with different symptoms.        History/Other:   Review of Systems   All other systems reviewed and are negative.    Current Outpatient Medications   Medication Sig Dispense Refill    benzonatate 200 MG Oral Cap Take 1 capsule (200 mg total) by mouth every 8 (eight) hours as needed for cough. 30 capsule 0    albuterol (PROAIR HFA) 108 (90 Base) MCG/ACT Inhalation Aero Soln Inhale 2 puffs into the lungs every 6 (six) hours as needed for Wheezing. 1 each 1    predniSONE 20 MG Oral Tab 2.5 tab day 1-3, 2 tab day 4, 1.5 tab day 5, 1 tab day 6, 0.5 tab day 7 13 tablet 0    rosuvastatin 5 MG Oral Tab Take 1 tablet (5 mg total) by mouth nightly. 90 tablet 0    fluticasone propionate 50 MCG/ACT Nasal Suspension 2 sprays by Nasal route nightly. 3 each 0    Multiple Vitamins-Minerals (MULTI-VITAMIN/MINERALS) Oral Tab Take 1 tablet by mouth daily.      Cholecalciferol (VITAMIN D3) 2000 units Oral Tab Take by mouth.       Allergies:No Known Allergies    Objective:   Physical Exam  Video visit    Assessment & Plan:   1. Acute cough    2. Nasal congestion        Video visit  1. Acute cough  - benzonatate 200 MG Oral Cap; Take 1 capsule (200 mg total) by mouth every 8 (eight) hours as needed for cough.  Dispense: 30 capsule; Refill: 0  - albuterol (PROAIR HFA) 108 (90 Base) MCG/ACT Inhalation Aero Soln; Inhale 2 puffs into the lungs every 6 (six) hours as needed for Wheezing.  Dispense: 1 each; Refill: 1  - predniSONE 20 MG Oral Tab; 2.5 tab day 1-3, 2 tab day 4, 1.5 tab day 5, 1 tab day 6, 0.5 tab day 7  Dispense: 13 tablet; Refill: 0    2. Nasal congestion  - benzonatate 200 MG Oral Cap; Take 1 capsule (200 mg total) by  mouth every 8 (eight) hours as needed for cough.  Dispense: 30 capsule; Refill: 0  - albuterol (PROAIR HFA) 108 (90 Base) MCG/ACT Inhalation Aero Soln; Inhale 2 puffs into the lungs every 6 (six) hours as needed for Wheezing.  Dispense: 1 each; Refill: 1  - predniSONE 20 MG Oral Tab; 2.5 tab day 1-3, 2 tab day 4, 1.5 tab day 5, 1 tab day 6, 0.5 tab day 7  Dispense: 13 tablet; Refill: 0      Meds This Visit:  Requested Prescriptions     Signed Prescriptions Disp Refills    benzonatate 200 MG Oral Cap 30 capsule 0     Sig: Take 1 capsule (200 mg total) by mouth every 8 (eight) hours as needed for cough.    albuterol (PROAIR HFA) 108 (90 Base) MCG/ACT Inhalation Aero Soln 1 each 1     Sig: Inhale 2 puffs into the lungs every 6 (six) hours as needed for Wheezing.    predniSONE 20 MG Oral Tab 13 tablet 0     Si.5 tab day 1-3, 2 tab day 4, 1.5 tab day 5, 1 tab day 6, 0.5 tab day 7       Imaging & Referrals:  None

## 2024-02-08 ENCOUNTER — ORDER TRANSCRIPTION (OUTPATIENT)
Dept: ADMINISTRATIVE | Facility: HOSPITAL | Age: 54
End: 2024-02-08

## 2024-02-08 DIAGNOSIS — Z12.31 ENCOUNTER FOR SCREENING MAMMOGRAM FOR MALIGNANT NEOPLASM OF BREAST: Primary | ICD-10-CM

## 2024-02-23 ENCOUNTER — HOSPITAL ENCOUNTER (OUTPATIENT)
Dept: MAMMOGRAPHY | Age: 54
Discharge: HOME | End: 2024-02-23
Attending: FAMILY MEDICINE
Payer: COMMERCIAL

## 2024-02-23 DIAGNOSIS — Z12.31 ENCOUNTER FOR SCREENING MAMMOGRAM FOR MALIGNANT NEOPLASM OF BREAST: ICD-10-CM

## 2024-02-23 PROCEDURE — 77067 SCR MAMMO BI INCL CAD: CPT | Performed by: FAMILY MEDICINE

## 2024-02-23 PROCEDURE — 77063 BREAST TOMOSYNTHESIS BI: CPT | Performed by: FAMILY MEDICINE

## 2024-03-04 ENCOUNTER — PATIENT MESSAGE (OUTPATIENT)
Dept: FAMILY MEDICINE CLINIC | Facility: CLINIC | Age: 54
End: 2024-03-04

## 2024-03-04 DIAGNOSIS — E78.00 SERUM CHOLESTEROL ELEVATED: ICD-10-CM

## 2024-03-04 DIAGNOSIS — R73.9 BLOOD GLUCOSE ELEVATED: Primary | ICD-10-CM

## 2024-03-04 DIAGNOSIS — E55.9 VITAMIN D DEFICIENCY: ICD-10-CM

## 2024-03-04 NOTE — TELEPHONE ENCOUNTER
From: Nanette Hennessy  To: Norma Reina  Sent: 3/4/2024 10:09 AM CST  Subject: Follow-up Labs    I am finally scheduled for my follow-up (from August annual exam). I believe I am supposed to have bloodwork prio to that visit....HBA1C, glucose, cholesterol... I'm just checking to be sure the order is in so I can have that done before my appointment next week. Thank you!

## 2024-03-10 NOTE — PROGRESS NOTES
HPI:   Nanette Hennessy is a 53 year old female who presents for a complete physical exam.     Wt Readings from Last 6 Encounters:   03/13/24 143 lb (64.9 kg)   01/09/24 144 lb 3.2 oz (65.4 kg)   08/29/23 143 lb (64.9 kg)   12/21/22 146 lb (66.2 kg)   07/20/22 143 lb (64.9 kg)   07/12/22 144 lb (65.3 kg)     Body mass index is 26.16 kg/m².     Cholesterol, Total (mg/dL)   Date Value   03/12/2024 205 (H)   06/22/2023 190   01/13/2023 210 (H)     HDL Cholesterol (mg/dL)   Date Value   03/12/2024 48   06/22/2023 42   01/13/2023 43     LDL Cholesterol (mg/dL)   Date Value   03/12/2024 120 (H)   06/22/2023 103 (H)   01/13/2023 134 (H)     AST (U/L)   Date Value   07/15/2023 22   06/22/2023 19   01/13/2023 19     ALT (U/L)   Date Value   07/15/2023 38   06/22/2023 36   01/13/2023 33       last pap- due   H/o abnormal HPV   No vaginal discharge  No bladder dysfunction  No menses  birth control-mirena 2017   + performing self breast exams  last mammogram- utd   dexa - never  c-scope - 2019   No calcium and vit D supplementation  No family history of breast colon or ovarian cancer    S/p pulm and heme (CLL) eval       Current Outpatient Medications   Medication Sig Dispense Refill    albuterol (PROAIR HFA) 108 (90 Base) MCG/ACT Inhalation Aero Soln Inhale 2 puffs into the lungs every 6 (six) hours as needed for Wheezing. 1 each 1    rosuvastatin 5 MG Oral Tab Take 1 tablet (5 mg total) by mouth nightly. 90 tablet 0    fluticasone propionate 50 MCG/ACT Nasal Suspension 2 sprays by Nasal route nightly. 3 each 0    Multiple Vitamins-Minerals (MULTI-VITAMIN/MINERALS) Oral Tab Take 1 tablet by mouth daily.      Cholecalciferol (VITAMIN D3) 2000 units Oral Tab Take by mouth.        Past Medical History:   Diagnosis Date    Brain cyst 3/2015    CLL (chronic lymphocytic leukemia) (HCC) 2019    Concussion 12/30/14    COVID-19 12/2020    High cholesterol       Past Surgical History:   Procedure Laterality Date    BRAIN SURGERY   04/15/2015    LEFT PARIETAL CRANIOTOMY FOR TUMOR RESECTION  WITH NEURONAVIGATION AND MICROSCOPE DISSECTION,     BREAST BIOPSY Right 2017    EYE SURGERY  10/2013    Lasik    NEEDLE BIOPSY RIGHT Right 2017    BENIGN FIBROADENOMA      Family History   Problem Relation Age of Onset    Breast Cancer Mother 52    High Cholesterol Mother     Colon Polyps Mother     Ovarian Cancer Paternal Grandmother     Breast Cancer Paternal Grandmother 55        approx age    Diabetes Father     Heart Disease Father     Other (CAD) Father     Other (CHF) Father     Heart Disease Paternal Grandfather     Heart Disease Maternal Grandfather     Other (stroke) Maternal Grandmother     Hypertension Brother     Cancer Maternal Uncle 35        Colon Ca      Social History:   Social History     Socioeconomic History    Marital status:    Tobacco Use    Smoking status: Former     Packs/day: 0.10     Years: 10.00     Additional pack years: 0.00     Total pack years: 1.00     Types: Cigarettes     Start date: 1987     Quit date: 1999     Years since quittin.2    Smokeless tobacco: Never   Substance and Sexual Activity    Alcohol use: Yes     Comment: 3-4x/month, 1-3 drinks    Drug use: No   Other Topics Concern    Caffeine Concern Yes     Comment: coffee, soda    Exercise Yes     Comment: couple days per week     Occ: . : . Children: 3  Works at [email or web address] .   Exercise: 5 x per week   Diet: watches minimally     REVIEW OF SYSTEMS:   GENERAL: feels well otherwise  SKIN: denies any unusual skin lesions  EYES:denies blurred vision or double vision  HEENT: denies nasal congestion, sinus pain or ST  LUNGS: denies shortness of breath with exertion  CARDIOVASCULAR: denies chest pain on exertion  GI: denies abdominal pain,denies heartburn  : denies dysuria, vaginal discharge or itching  MUSCULOSKELETAL: denies back pain  NEURO: denies headaches  PSYCHE: denies depression or anxiety  HEMATOLOGIC: denies hx of anemia  ENDOCRINE:  denies thyroid history  ALL/ASTHMA: denies hx of allergy or asthma    EXAM:   /66   Pulse 86   Resp 16   Ht 5' 2\" (1.575 m)   Wt 143 lb (64.9 kg)   SpO2 98%   BMI 26.16 kg/m²   Body mass index is 26.16 kg/m².   GENERAL: alert and oriented X 3, well developed, well nourished,in no apparent distress  CARDIO: RRR without murmur  LUNGS: clear to auscultation  NECK: supple,no adenopathy,no thyromegaly  HEENT: atraumatic, normocephalic,ears and throat are clear  EYES:PERRLA, EOMI, normal,conjunctiva are clear  SKIN: norashes,no suspicious lesions  GI: good BS's,no masses, HSM or tenderness  CHEST: no chest tenderness  BREAST: no axillary LAD, no masses no nipple discharge bilaterally  ((: external genitalia - no inguinal LAD, no lesions.    Speculum exam- introitus is normal,scant discharge,cervix is pink, IUD string visualized  Bimanual exam- no adnexal masses or tenderness  RECTAL:good rectal tone,no mass, brown stool, stool is OB negative)) deferred   MUSCULOSKELETAL: back is not tender,FROM of the back  EXTREMITIES: no cyanosis, clubbing or edema  NEURO: cranial nerves are intact,motor and sensory are grossly intact    ASSESSMENT AND PLAN:   Nanette Hennessy is a 53 year old female who presents for a well woman exam.     1. Well woman exam with routine gynecological exam    - THINPREP TIS AND HPV MRNA E6/E7 [99598] [Q]    2. Annual physical exam    - Lipid Panel; Future  - CBC With Differential With Platelet; Future  - Vitamin B12; Future  - Comp Metabolic Panel (14); Future  - Free T4, (Free Thyroxine); Future  - Assay, Thyroid Stim Hormone; Future  - Hemoglobin A1C; Future  - Vitamin D [E]; Future    3. Encounter for screening mammogram for high-risk patient    - [facility] ERENDIRA 2D+3D SCREENING BILAT (CPT=77067/86600); Future      Discussed diet, exercise,calcium, vitamin D, fish oil and self breast exams.   Questions answered and patient indicates understanding of these issues and agrees to the  plan.  Follow up in 1 year or sooner if needed.

## 2024-03-12 ENCOUNTER — LAB ENCOUNTER (OUTPATIENT)
Dept: LAB | Age: 54
End: 2024-03-12
Attending: FAMILY MEDICINE
Payer: COMMERCIAL

## 2024-03-12 DIAGNOSIS — R73.9 BLOOD GLUCOSE ELEVATED: ICD-10-CM

## 2024-03-12 DIAGNOSIS — E78.00 SERUM CHOLESTEROL ELEVATED: ICD-10-CM

## 2024-03-12 DIAGNOSIS — E55.9 VITAMIN D DEFICIENCY: ICD-10-CM

## 2024-03-12 LAB
CHOLEST SERPL-MCNC: 205 MG/DL (ref ?–200)
EST. AVERAGE GLUCOSE BLD GHB EST-MCNC: 128 MG/DL (ref 68–126)
FASTING PATIENT LIPID ANSWER: YES
HBA1C MFR BLD: 6.1 % (ref ?–5.7)
HDLC SERPL-MCNC: 48 MG/DL (ref 40–59)
LDLC SERPL CALC-MCNC: 120 MG/DL (ref ?–100)
NONHDLC SERPL-MCNC: 157 MG/DL (ref ?–130)
TRIGL SERPL-MCNC: 209 MG/DL (ref 30–149)
VIT D+METAB SERPL-MCNC: 45.8 NG/ML (ref 30–100)
VLDLC SERPL CALC-MCNC: 37 MG/DL (ref 0–30)

## 2024-03-12 PROCEDURE — 83036 HEMOGLOBIN GLYCOSYLATED A1C: CPT | Performed by: FAMILY MEDICINE

## 2024-03-12 PROCEDURE — 82306 VITAMIN D 25 HYDROXY: CPT | Performed by: FAMILY MEDICINE

## 2024-03-12 PROCEDURE — 80061 LIPID PANEL: CPT | Performed by: FAMILY MEDICINE

## 2024-03-13 ENCOUNTER — OFFICE VISIT (OUTPATIENT)
Dept: FAMILY MEDICINE CLINIC | Facility: CLINIC | Age: 54
End: 2024-03-13
Payer: COMMERCIAL

## 2024-03-13 VITALS
BODY MASS INDEX: 26.31 KG/M2 | SYSTOLIC BLOOD PRESSURE: 108 MMHG | HEART RATE: 86 BPM | WEIGHT: 143 LBS | OXYGEN SATURATION: 98 % | RESPIRATION RATE: 16 BRPM | HEIGHT: 62 IN | DIASTOLIC BLOOD PRESSURE: 66 MMHG

## 2024-03-13 DIAGNOSIS — Z12.31 ENCOUNTER FOR SCREENING MAMMOGRAM FOR HIGH-RISK PATIENT: ICD-10-CM

## 2024-03-13 DIAGNOSIS — Z00.00 ANNUAL PHYSICAL EXAM: ICD-10-CM

## 2024-03-13 DIAGNOSIS — Z01.419 WELL WOMAN EXAM WITH ROUTINE GYNECOLOGICAL EXAM: Primary | ICD-10-CM

## 2024-03-13 PROCEDURE — 90750 HZV VACC RECOMBINANT IM: CPT | Performed by: FAMILY MEDICINE

## 2024-03-13 PROCEDURE — 99396 PREV VISIT EST AGE 40-64: CPT | Performed by: FAMILY MEDICINE

## 2024-03-13 PROCEDURE — 3008F BODY MASS INDEX DOCD: CPT | Performed by: FAMILY MEDICINE

## 2024-03-13 PROCEDURE — 3078F DIAST BP <80 MM HG: CPT | Performed by: FAMILY MEDICINE

## 2024-03-13 PROCEDURE — 3074F SYST BP LT 130 MM HG: CPT | Performed by: FAMILY MEDICINE

## 2024-03-13 PROCEDURE — 90471 IMMUNIZATION ADMIN: CPT | Performed by: FAMILY MEDICINE

## 2024-03-15 LAB — HPV MRNA E6/E7: NOT DETECTED

## 2024-10-30 DIAGNOSIS — E78.00 ELEVATED CHOLESTEROL: ICD-10-CM

## 2024-10-30 DIAGNOSIS — Z82.49 FAMILY HISTORY OF EARLY CAD: ICD-10-CM

## 2024-10-31 RX ORDER — ROSUVASTATIN CALCIUM 5 MG/1
5 TABLET, COATED ORAL NIGHTLY
Qty: 90 TABLET | Refills: 0 | Status: SHIPPED | OUTPATIENT
Start: 2024-10-31

## 2024-10-31 NOTE — TELEPHONE ENCOUNTER
.A refill request was received for:  Requested Prescriptions     Pending Prescriptions Disp Refills    ROSUVASTATIN 5 MG Oral Tab [Pharmacy Med Name: ROSUVASTATIN 5MG TABLETS] 90 tablet 0     Sig: TAKE 1 TABLET(5 MG) BY MOUTH EVERY NIGHT       Last refill date:   10/10/23    Last office visit: 3/13/24    Follow up due:  Future Appointments   Date Time Provider Department Center   12/17/2024  8:00 AM Eh Bonds MD [facility] SUB GI

## 2024-11-07 ENCOUNTER — LAB ENCOUNTER (OUTPATIENT)
Dept: LAB | Age: 54
End: 2024-11-07
Attending: INTERNAL MEDICINE
Payer: COMMERCIAL

## 2024-11-07 DIAGNOSIS — C91.10 CLL (CHRONIC LYMPHOCYTIC LEUKEMIA) (HCC): ICD-10-CM

## 2024-11-07 DIAGNOSIS — Z00.00 ANNUAL PHYSICAL EXAM: ICD-10-CM

## 2024-11-07 LAB
ALBUMIN SERPL-MCNC: 4.5 G/DL (ref 3.2–4.8)
ALBUMIN/GLOB SERPL: 1.8 {RATIO} (ref 1–2)
ALP LIVER SERPL-CCNC: 79 U/L
ALT SERPL-CCNC: 30 U/L
ANION GAP SERPL CALC-SCNC: 1 MMOL/L (ref 0–18)
AST SERPL-CCNC: 24 U/L (ref ?–34)
BASOPHILS # BLD AUTO: 0.04 X10(3) UL (ref 0–0.2)
BASOPHILS NFR BLD AUTO: 0.2 %
BILIRUB SERPL-MCNC: 0.6 MG/DL (ref 0.3–1.2)
BUN BLD-MCNC: 17 MG/DL (ref 9–23)
CALCIUM BLD-MCNC: 10.8 MG/DL (ref 8.7–10.4)
CHLORIDE SERPL-SCNC: 104 MMOL/L (ref 98–112)
CO2 SERPL-SCNC: 34 MMOL/L (ref 21–32)
CREAT BLD-MCNC: 0.83 MG/DL
EGFRCR SERPLBLD CKD-EPI 2021: 84 ML/MIN/1.73M2 (ref 60–?)
EOSINOPHIL # BLD AUTO: 0.21 X10(3) UL (ref 0–0.7)
EOSINOPHIL NFR BLD AUTO: 1.1 %
ERYTHROCYTE [DISTWIDTH] IN BLOOD BY AUTOMATED COUNT: 12.1 %
FASTING STATUS PATIENT QL REPORTED: NO
GLOBULIN PLAS-MCNC: 2.5 G/DL (ref 2–3.5)
GLUCOSE BLD-MCNC: 109 MG/DL (ref 70–99)
HCT VFR BLD AUTO: 42.8 %
HGB BLD-MCNC: 14.1 G/DL
IMM GRANULOCYTES # BLD AUTO: 0.05 X10(3) UL (ref 0–1)
IMM GRANULOCYTES NFR BLD: 0.3 %
LDH SERPL L TO P-CCNC: 196 U/L
LYMPHOCYTES # BLD AUTO: 13.48 X10(3) UL (ref 1–4)
LYMPHOCYTES NFR BLD AUTO: 73.8 %
MCH RBC QN AUTO: 29.5 PG (ref 26–34)
MCHC RBC AUTO-ENTMCNC: 32.9 G/DL (ref 31–37)
MCV RBC AUTO: 89.5 FL
MONOCYTES # BLD AUTO: 0.64 X10(3) UL (ref 0.1–1)
MONOCYTES NFR BLD AUTO: 3.5 %
NEUTROPHILS # BLD AUTO: 3.85 X10 (3) UL (ref 1.5–7.7)
NEUTROPHILS # BLD AUTO: 3.85 X10(3) UL (ref 1.5–7.7)
NEUTROPHILS NFR BLD AUTO: 21.1 %
OSMOLALITY SERPL CALC.SUM OF ELEC: 290 MOSM/KG (ref 275–295)
PLATELET # BLD AUTO: 173 10(3)UL (ref 150–450)
POTASSIUM SERPL-SCNC: 3.9 MMOL/L (ref 3.5–5.1)
PROT SERPL-MCNC: 7 G/DL (ref 5.7–8.2)
RBC # BLD AUTO: 4.78 X10(6)UL
SODIUM SERPL-SCNC: 139 MMOL/L (ref 136–145)
VIT D+METAB SERPL-MCNC: 37.4 NG/ML (ref 30–100)
WBC # BLD AUTO: 18.3 X10(3) UL (ref 4–11)

## 2024-11-07 PROCEDURE — 82306 VITAMIN D 25 HYDROXY: CPT

## 2024-11-07 PROCEDURE — 36415 COLL VENOUS BLD VENIPUNCTURE: CPT

## 2024-11-07 PROCEDURE — 85025 COMPLETE CBC W/AUTO DIFF WBC: CPT

## 2024-11-07 PROCEDURE — 83615 LACTATE (LD) (LDH) ENZYME: CPT

## 2024-11-07 PROCEDURE — 80053 COMPREHEN METABOLIC PANEL: CPT

## 2024-11-11 NOTE — PROGRESS NOTES
[facility] Progress Note      Patient Name:  Nanette Hennessy  YOB: 1970  Medical Record Number:  [id number]    Date of visit: 11/12/2024    CHIEF COMPLAINT: Stage 0 CLL.     HPI:     54 year old that I initially saw on 12/21/18 after she underwent workup of fullness in her left neck.  Ultrasound 12/1/18-normal lymph nodes.  Labs showed a lymphocytosis.  Additional workup showed evidence of CLL.  Since then she has been followed by surveillance.  Presents for evaluation.      No new complaints.  No fevers, chills, night sweats, unexpected weight loss.  Chronic postnasal drip, on Flonase.    SOCIAL HISTORY:    , works in [employer] office of [employer].  3 children-daughter is 24 and a nurse, 1 son is doing [employer] and [employer] and youngest is freshman in [place] doing engineering.      ROS:     Constitutional: Mild fatigue.  Neurologic: no headache, seizures, diplopia or weakness  Respiratory: Chronic postnasal drip.    Cardiovascular: No chest pain or shortness of breath.  GI: no nausea, vomiting, diarrhea or BRBPR  Musculoskeletal: no body-ache or joint pain  Dermatologic: no alopecia, rash, pruritis  : no hematuria, dysuria or frequency  Psych: no confusion or depression   Heme: no easy bruising or bleeding     ALLERGIES:  No Known Allergies    MEDICATIONS:      Current Outpatient Medications   Medication Sig Dispense Refill    ROSUVASTATIN 5 MG Oral Tab TAKE 1 TABLET(5 MG) BY MOUTH EVERY NIGHT 90 tablet 0    fluticasone propionate 50 MCG/ACT Nasal Suspension 2 sprays by Nasal route nightly. 3 each 0    Multiple Vitamins-Minerals (MULTI-VITAMIN/MINERALS) Oral Tab Take 1 tablet by mouth daily.      Cholecalciferol (VITAMIN D3) 2000 units Oral Tab Take by mouth.      PEG 3350-KCl-Na Bicarb-NaCl 420 g Oral Recon Soln Take as directed by physician (Patient not taking: Reported on 11/12/2024) 4000 mL 0       VITALS:  Height: 157.5 cm (5' 2.01\") (11/12 1322)  Weight: 64.6 kg  (142 lb 8 oz) (1322)  BSA (Calculated - sq m): 1.66 sq meters (1322)  Pulse: 80 (1322)  BP: 146/91 (1322)  Temp: 98.6 °F (37 °C) (1322)  Do Not Use - Resp Rate: --  SpO2: 98 % (1322)    PS:  ECO    PHYSICAL EXAM:    General: alert and oriented x 3, not in acute distress.  HEENT: ZINA, oropharynx  clear.  Neck: supple.  No palpable adenopathy.  CVS: S1S2, regular  Rhythm, no murmurs.   Lungs: Clear to auscultation and percussion.  Abdomen: Soft, non tender, no hepato-splenomegaly.  Extremities:  No edema.  CNS: no focal deficit    Emotional well being: Patient's emotional well being was assessed.  No issues requiring acute psychosocial intervention were identified.     PATHOLOGY:    IGHV Mutation Analysis by Sequencing Mutated    ABNORMAL FISH RESULT 13q14 ([id number]): deletion present     2% of the B-cells (CD19 positive) are ZAP70 positive.     LABS:     No results found for this or any previous visit (from the past 24 hours).    ASSESSMENT AND PLAN:     #  CLL:  IPI 0, low risk.  ( Carmona Stage 0, 13q-, ZAP 70 negative, IGVH mutated): Diag .  No B type symptoms or high risk features. Counts stable. Seen at  for second opinion and observation amanda as well.      No B type symptoms.  No palpable adenopathy or splenomegaly.  No concern for progression.  Continue annual follow-up.  Should call for concerning symptoms.    ORDERS PLACED:    Return in about 1 year (around 2025) for Labs, MD visit.    Olga Perez M.D.    [facility]    [address] [place], [address]    2024

## 2024-11-12 ENCOUNTER — IMMUNIZATION (OUTPATIENT)
Dept: FAMILY MEDICINE CLINIC | Facility: CLINIC | Age: 54
End: 2024-11-12
Payer: COMMERCIAL

## 2024-11-12 ENCOUNTER — OFFICE VISIT (OUTPATIENT)
Dept: HEMATOLOGY/ONCOLOGY | Facility: HOSPITAL | Age: 54
End: 2024-11-12
Attending: INTERNAL MEDICINE
Payer: COMMERCIAL

## 2024-11-12 VITALS
HEIGHT: 62.01 IN | SYSTOLIC BLOOD PRESSURE: 146 MMHG | DIASTOLIC BLOOD PRESSURE: 91 MMHG | HEART RATE: 80 BPM | BODY MASS INDEX: 25.89 KG/M2 | TEMPERATURE: 99 F | RESPIRATION RATE: 16 BRPM | OXYGEN SATURATION: 98 % | WEIGHT: 142.5 LBS

## 2024-11-12 DIAGNOSIS — C91.10 CLL (CHRONIC LYMPHOCYTIC LEUKEMIA) (HCC): Primary | ICD-10-CM

## 2024-11-12 DIAGNOSIS — Z23 NEED FOR INFLUENZA VACCINATION: Primary | ICD-10-CM

## 2024-11-12 PROCEDURE — 90471 IMMUNIZATION ADMIN: CPT

## 2024-11-12 PROCEDURE — 3080F DIAST BP >= 90 MM HG: CPT | Performed by: INTERNAL MEDICINE

## 2024-11-12 PROCEDURE — 90656 IIV3 VACC NO PRSV 0.5 ML IM: CPT

## 2024-11-12 PROCEDURE — 99214 OFFICE O/P EST MOD 30 MIN: CPT | Performed by: INTERNAL MEDICINE

## 2024-11-12 PROCEDURE — 3008F BODY MASS INDEX DOCD: CPT | Performed by: INTERNAL MEDICINE

## 2024-11-12 PROCEDURE — 3077F SYST BP >= 140 MM HG: CPT | Performed by: INTERNAL MEDICINE

## 2024-11-12 NOTE — PROGRESS NOTES
Education Record    Learner:  Patient    Disease / Diagnosis:  Stage 0 CLL  Annual visit   Barriers / Limitations:  None   Comments:    Method:  Discussion   Comments:    General Topics:  Plan of care reviewed   Comments:    Outcome:  Shows understanding   Comments:   Pt feeling well.   Does reports she was advised to take flonase at [facility] for persistent post nasal drip and nagging cough. Has been going on a year. Does not take flonase daily.   Will address with PCP,.   Colonoscopy in December

## 2024-12-16 ENCOUNTER — TELEPHONE (OUTPATIENT)
Dept: FAMILY MEDICINE CLINIC | Facility: CLINIC | Age: 54
End: 2024-12-16

## 2024-12-16 ENCOUNTER — TELEMEDICINE (OUTPATIENT)
Dept: FAMILY MEDICINE CLINIC | Facility: CLINIC | Age: 54
End: 2024-12-16
Payer: COMMERCIAL

## 2024-12-16 ENCOUNTER — LAB ENCOUNTER (OUTPATIENT)
Dept: LAB | Facility: HOSPITAL | Age: 54
End: 2024-12-16
Attending: FAMILY MEDICINE
Payer: COMMERCIAL

## 2024-12-16 DIAGNOSIS — R00.2 PALPITATIONS: ICD-10-CM

## 2024-12-16 DIAGNOSIS — R00.2 PALPITATIONS: Primary | ICD-10-CM

## 2024-12-16 LAB
ALBUMIN SERPL-MCNC: 4.7 G/DL (ref 3.2–4.8)
ALBUMIN/GLOB SERPL: 1.9 {RATIO} (ref 1–2)
ALP LIVER SERPL-CCNC: 87 U/L
ALT SERPL-CCNC: 30 U/L
ANION GAP SERPL CALC-SCNC: 5 MMOL/L (ref 0–18)
AST SERPL-CCNC: 24 U/L (ref ?–34)
BILIRUB SERPL-MCNC: 0.6 MG/DL (ref 0.3–1.2)
BUN BLD-MCNC: 14 MG/DL (ref 9–23)
CALCIUM BLD-MCNC: 10.4 MG/DL (ref 8.7–10.4)
CHLORIDE SERPL-SCNC: 106 MMOL/L (ref 98–112)
CO2 SERPL-SCNC: 31 MMOL/L (ref 21–32)
CREAT BLD-MCNC: 0.94 MG/DL
EGFRCR SERPLBLD CKD-EPI 2021: 73 ML/MIN/1.73M2 (ref 60–?)
FASTING STATUS PATIENT QL REPORTED: NO
GLOBULIN PLAS-MCNC: 2.5 G/DL (ref 2–3.5)
GLUCOSE BLD-MCNC: 140 MG/DL (ref 70–99)
MAGNESIUM SERPL-MCNC: 1.8 MG/DL (ref 1.6–2.6)
OSMOLALITY SERPL CALC.SUM OF ELEC: 297 MOSM/KG (ref 275–295)
POTASSIUM SERPL-SCNC: 3.8 MMOL/L (ref 3.5–5.1)
PROT SERPL-MCNC: 7.2 G/DL (ref 5.7–8.2)
SODIUM SERPL-SCNC: 142 MMOL/L (ref 136–145)
T4 FREE SERPL-MCNC: 1.4 NG/DL (ref 0.8–1.7)
TSI SER-ACNC: 1.34 UIU/ML (ref 0.55–4.78)

## 2024-12-16 PROCEDURE — 80053 COMPREHEN METABOLIC PANEL: CPT

## 2024-12-16 PROCEDURE — 84443 ASSAY THYROID STIM HORMONE: CPT

## 2024-12-16 PROCEDURE — 36415 COLL VENOUS BLD VENIPUNCTURE: CPT

## 2024-12-16 PROCEDURE — 83735 ASSAY OF MAGNESIUM: CPT

## 2024-12-16 PROCEDURE — 84439 ASSAY OF FREE THYROXINE: CPT

## 2024-12-16 NOTE — PROGRESS NOTES
This visit is conducted using Telemedicine with live, interactive video and audio.    [facility]  Telehealth Verbal Consent   I conducted a telehealth visit with Nanette Hennessy today, 12/16/24, which was completed using two-way, real-time interactive audio and video communication. This has been done in good gustabo to provide continuity of care in the best interest of the provider-patient relationship, due to the COVID -19 public health crisis/national emergency where restrictions of face-to-face office visits are ongoing. Every conscious effort was taken to allow for sufficient and adequate time to complete the visit.  The patient was made aware of the limitations of the telehealth visit, including treatment limitations as no physical exam could be performed.  The patient was advised to call 911 or to go to the ER in case there was an emergency.  The patient was also advised of the potential privacy & security concerns related to the telehealth platform.   The patient was made aware of where to find [facility]'s notice of privacy practices, telehealth consent form and other related consent forms and documents.  which are located on the [facility] website. The patient verbally agreed to telehealth consent form, related consents and the risks discussed.    Lastly, the patient confirmed that they were in [place].   Included in this visit, time may have been spent reviewing labs, medications, radiology tests and decision making. Appropriate medical decision-making and tests are ordered as detailed in the plan of care above.  Coding/billing information is submitted for this visit based on complexity of care and/or time spent for the visit.      HPI:   Nanette Hennessy is a 54 year old female who presents with palpitations     Pt has been experiencing an elevated HR at rest with some lightheadedness  In apple watch - noted HR 70-80's and then will drop to 50-40's   Normal HR 60-70's   No HR > 100   Not  working out other than walking   Pt feels fluttering / not anxious   No chest pain       Current Outpatient Medications   Medication Sig Dispense Refill    ROSUVASTATIN 5 MG Oral Tab TAKE 1 TABLET(5 MG) BY MOUTH EVERY NIGHT 90 tablet 0    fluticasone propionate 50 MCG/ACT Nasal Suspension 2 sprays by Nasal route nightly. 3 each 0    Multiple Vitamins-Minerals (MULTI-VITAMIN/MINERALS) Oral Tab Take 1 tablet by mouth daily.      Cholecalciferol (VITAMIN D3) 2000 units Oral Tab Take by mouth.        Past Medical History:    Brain cyst    CLL (chronic lymphocytic leukemia) (HCC)    Concussion    COVID-19    Heartburn    High cholesterol      Past Surgical History:   Procedure Laterality Date    Brain surgery  04/15/2015    LEFT PARIETAL CRANIOTOMY FOR TUMOR RESECTION  WITH NEURONAVIGATION AND MICROSCOPE DISSECTION,     Breast biopsy Right 2017    Colonoscopy      Eye surgery  10/2013    Lasik    Needle biopsy right Right 2017    BENIGN FIBROADENOMA      Family History   Problem Relation Age of Onset    Heart Attack Father     Hypertension Father     Diabetes Father     Heart Disease Father     Other (CAD) Father     Other (CHF) Father     Breast Cancer Mother 52    High Cholesterol Mother     Colon Polyps Mother     Stroke Maternal Grandmother     Other (stroke) Maternal Grandmother     Heart Disease Maternal Grandfather     Ovarian Cancer Paternal Grandmother     Breast Cancer Paternal Grandmother 55        approx age    Heart Disease Paternal Grandfather     Hypertension Brother     Cancer Maternal Uncle 35        Colon Ca      Social History:   Social History     Socioeconomic History    Marital status:    Tobacco Use    Smoking status: Former     Current packs/day: 0.00     Average packs/day: 0.1 packs/day for 12.0 years (1.2 ttl pk-yrs)     Types: Cigarettes     Start date: 1987     Quit date: 1999     Years since quittin.9    Smokeless tobacco: Never   Substance and Sexual Activity     Alcohol use: Yes     Alcohol/week: 4.0 standard drinks of alcohol     Types: 4 Glasses of wine per week     Comment: 3-4x/month, 1-3 drinks    Drug use: No   Other Topics Concern    Caffeine Concern Yes     Comment: coffee, soda    Exercise Yes     Comment: couple days per week     Social Drivers of Health      [facility]    Social Connections    [facility]    Housing Stability     Occ: . : . Children: 3  Works at [employer] .   Exercise: 5 x per week   Diet: watches minimally     REVIEW OF SYSTEMS:   GENERAL: feels well otherwise  SKIN: denies any unusual skin lesions  EYES:denies blurred vision or double vision  HEENT: denies nasal congestion, sinus pain or ST  LUNGS: denies shortness of breath with exertion  CARDIOVASCULAR: denies chest pain on exertion  GI: denies abdominal pain,denies heartburn  : denies dysuria, vaginal discharge or itching  MUSCULOSKELETAL: denies back pain  NEURO: denies headaches  PSYCHE: denies depression or anxiety  HEMATOLOGIC: denies hx of anemia  ENDOCRINE: denies thyroid history  ALL/ASTHMA: denies hx of allergy or asthma    EXAM:     alert, appears stated age and cooperative, Normocephalic, without obvious abnormality, atraumatic, lips, mucosa, and tongue normal; teeth and gums normal, Speaking in full sentences comfortably, Normal work of breathing, Skin color, texture, turgor normal. No rashes or lesions and age appropriate, normal, logical connections, person, place and time/date and no suicidal ideation        ASSESSMENT AND PLAN:   Nanette Hennessy is a 54 year old female who presents with     1. Palpitations    - Comp Metabolic Panel (14); Future  - Magnesium [E]; Future  - Free T4, (Free Thyroxine); Future  - Assay, Thyroid Stim Hormone; Future  - CARD ZIO EXTENDED MONITOR 3-7 DAYS (CPT=93643/36892); Future        Questions answered and  patient indicates understanding of these issues and agrees to the plan.  Follow up in 3 mo  or sooner if needed.

## 2024-12-16 NOTE — TELEPHONE ENCOUNTER
Patient calling - was scheduled for a colonoscopy tomorrow that she is canceling.  Over the weekend she had noticed an issue with her heart rate going high and then dropping.    She wanted to discuss with Dr. Reina.    Please advise.

## 2024-12-23 ENCOUNTER — HOSPITAL ENCOUNTER (OUTPATIENT)
Dept: CV DIAGNOSTICS | Facility: HOSPITAL | Age: 54
Discharge: HOME | End: 2024-12-23
Attending: FAMILY MEDICINE
Payer: COMMERCIAL

## 2024-12-23 DIAGNOSIS — R00.2 PALPITATIONS: ICD-10-CM

## 2024-12-23 PROCEDURE — 93242 EXT ECG>48HR<7D RECORDING: CPT | Performed by: FAMILY MEDICINE

## 2024-12-23 PROCEDURE — 93243 EXT ECG>48HR<7D SCAN A/R: CPT | Performed by: FAMILY MEDICINE

## 2025-02-02 DIAGNOSIS — Z82.49 FAMILY HISTORY OF EARLY CAD: ICD-10-CM

## 2025-02-02 DIAGNOSIS — E78.00 ELEVATED CHOLESTEROL: ICD-10-CM

## 2025-02-03 RX ORDER — ROSUVASTATIN CALCIUM 5 MG/1
5 TABLET, COATED ORAL NIGHTLY
Qty: 90 TABLET | Refills: 0 | Status: SHIPPED | OUTPATIENT
Start: 2025-02-03

## 2025-02-25 ENCOUNTER — HOSPITAL ENCOUNTER (OUTPATIENT)
Dept: MAMMOGRAPHY | Age: 55
Discharge: HOME | End: 2025-02-25
Attending: FAMILY MEDICINE
Payer: COMMERCIAL

## 2025-02-25 DIAGNOSIS — Z12.31 ENCOUNTER FOR SCREENING MAMMOGRAM FOR HIGH-RISK PATIENT: ICD-10-CM

## 2025-02-25 PROCEDURE — 77063 BREAST TOMOSYNTHESIS BI: CPT | Performed by: FAMILY MEDICINE

## 2025-02-25 PROCEDURE — 77067 SCR MAMMO BI INCL CAD: CPT | Performed by: FAMILY MEDICINE

## 2025-03-12 ENCOUNTER — LAB ENCOUNTER (OUTPATIENT)
Dept: LAB | Age: 55
End: 2025-03-12
Attending: FAMILY MEDICINE
Payer: COMMERCIAL

## 2025-03-12 DIAGNOSIS — Z00.00 ANNUAL PHYSICAL EXAM: ICD-10-CM

## 2025-03-12 LAB
ALBUMIN SERPL-MCNC: 5 G/DL (ref 3.2–4.8)
ALBUMIN/GLOB SERPL: 2.2 {RATIO} (ref 1–2)
ALP LIVER SERPL-CCNC: 69 U/L
ALT SERPL-CCNC: 29 U/L
ANION GAP SERPL CALC-SCNC: 9 MMOL/L (ref 0–18)
AST SERPL-CCNC: 23 U/L (ref ?–34)
BASOPHILS # BLD AUTO: 0.04 X10(3) UL (ref 0–0.2)
BASOPHILS NFR BLD AUTO: 0.2 %
BILIRUB SERPL-MCNC: 0.8 MG/DL (ref 0.3–1.2)
BUN BLD-MCNC: 17 MG/DL (ref 9–23)
CALCIUM BLD-MCNC: 10.1 MG/DL (ref 8.7–10.6)
CHLORIDE SERPL-SCNC: 102 MMOL/L (ref 98–112)
CHOLEST SERPL-MCNC: 207 MG/DL (ref ?–200)
CO2 SERPL-SCNC: 30 MMOL/L (ref 21–32)
CREAT BLD-MCNC: 0.93 MG/DL
EGFRCR SERPLBLD CKD-EPI 2021: 73 ML/MIN/1.73M2 (ref 60–?)
EOSINOPHIL # BLD AUTO: 0.17 X10(3) UL (ref 0–0.7)
EOSINOPHIL NFR BLD AUTO: 1 %
ERYTHROCYTE [DISTWIDTH] IN BLOOD BY AUTOMATED COUNT: 12.6 %
EST. AVERAGE GLUCOSE BLD GHB EST-MCNC: 123 MG/DL (ref 68–126)
FASTING PATIENT LIPID ANSWER: YES
FASTING STATUS PATIENT QL REPORTED: YES
GLOBULIN PLAS-MCNC: 2.3 G/DL (ref 2–3.5)
GLUCOSE BLD-MCNC: 88 MG/DL (ref 70–99)
HBA1C MFR BLD: 5.9 % (ref ?–5.7)
HCT VFR BLD AUTO: 44.6 %
HDLC SERPL-MCNC: 45 MG/DL (ref 40–59)
HGB BLD-MCNC: 14.5 G/DL
IMM GRANULOCYTES # BLD AUTO: 0.04 X10(3) UL (ref 0–1)
IMM GRANULOCYTES NFR BLD: 0.2 %
LDLC SERPL CALC-MCNC: 138 MG/DL (ref ?–100)
LYMPHOCYTES # BLD AUTO: 12.87 X10(3) UL (ref 1–4)
LYMPHOCYTES NFR BLD AUTO: 74.2 %
MCH RBC QN AUTO: 29.6 PG (ref 26–34)
MCHC RBC AUTO-ENTMCNC: 32.5 G/DL (ref 31–37)
MCV RBC AUTO: 91 FL
MONOCYTES # BLD AUTO: 0.55 X10(3) UL (ref 0.1–1)
MONOCYTES NFR BLD AUTO: 3.2 %
MORPHOLOGY: NORMAL
NEUTROPHILS # BLD AUTO: 3.67 X10 (3) UL (ref 1.5–7.7)
NEUTROPHILS # BLD AUTO: 3.67 X10(3) UL (ref 1.5–7.7)
NEUTROPHILS NFR BLD AUTO: 21.2 %
NONHDLC SERPL-MCNC: 162 MG/DL (ref ?–130)
OSMOLALITY SERPL CALC.SUM OF ELEC: 293 MOSM/KG (ref 275–295)
PLATELET # BLD AUTO: 175 10(3)UL (ref 150–450)
PLATELET MORPHOLOGY: NORMAL
POTASSIUM SERPL-SCNC: 4 MMOL/L (ref 3.5–5.1)
PROT SERPL-MCNC: 7.3 G/DL (ref 5.7–8.2)
RBC # BLD AUTO: 4.9 X10(6)UL
SODIUM SERPL-SCNC: 141 MMOL/L (ref 136–145)
T4 FREE SERPL-MCNC: 1.4 NG/DL (ref 0.8–1.7)
TRIGL SERPL-MCNC: 134 MG/DL (ref 30–149)
TSI SER-ACNC: 1.41 UIU/ML (ref 0.55–4.78)
VIT B12 SERPL-MCNC: 476 PG/ML (ref 211–911)
VLDLC SERPL CALC-MCNC: 25 MG/DL (ref 0–30)
WBC # BLD AUTO: 17.3 X10(3) UL (ref 4–11)

## 2025-03-12 PROCEDURE — 80050 GENERAL HEALTH PANEL: CPT | Performed by: FAMILY MEDICINE

## 2025-03-12 PROCEDURE — 80061 LIPID PANEL: CPT | Performed by: FAMILY MEDICINE

## 2025-03-12 PROCEDURE — 82607 VITAMIN B-12: CPT | Performed by: FAMILY MEDICINE

## 2025-03-12 PROCEDURE — 83036 HEMOGLOBIN GLYCOSYLATED A1C: CPT | Performed by: FAMILY MEDICINE

## 2025-03-12 PROCEDURE — 84439 ASSAY OF FREE THYROXINE: CPT | Performed by: FAMILY MEDICINE

## 2025-03-19 ENCOUNTER — OFFICE VISIT (OUTPATIENT)
Dept: FAMILY MEDICINE CLINIC | Facility: CLINIC | Age: 55
End: 2025-03-19
Payer: COMMERCIAL

## 2025-03-19 VITALS
OXYGEN SATURATION: 98 % | RESPIRATION RATE: 16 BRPM | HEART RATE: 85 BPM | SYSTOLIC BLOOD PRESSURE: 100 MMHG | HEIGHT: 62 IN | WEIGHT: 142 LBS | BODY MASS INDEX: 26.13 KG/M2 | DIASTOLIC BLOOD PRESSURE: 70 MMHG

## 2025-03-19 DIAGNOSIS — R00.2 PALPITATIONS: ICD-10-CM

## 2025-03-19 DIAGNOSIS — Z00.00 ANNUAL PHYSICAL EXAM: Primary | ICD-10-CM

## 2025-03-19 DIAGNOSIS — Z12.31 ENCOUNTER FOR SCREENING MAMMOGRAM FOR HIGH-RISK PATIENT: ICD-10-CM

## 2025-03-19 DIAGNOSIS — Z12.11 COLON CANCER SCREENING: ICD-10-CM

## 2025-03-19 DIAGNOSIS — Z82.49 FAMILY HISTORY OF EARLY CAD: ICD-10-CM

## 2025-03-19 PROCEDURE — 3074F SYST BP LT 130 MM HG: CPT | Performed by: FAMILY MEDICINE

## 2025-03-19 PROCEDURE — 3078F DIAST BP <80 MM HG: CPT | Performed by: FAMILY MEDICINE

## 2025-03-19 PROCEDURE — 99396 PREV VISIT EST AGE 40-64: CPT | Performed by: FAMILY MEDICINE

## 2025-03-19 PROCEDURE — 3008F BODY MASS INDEX DOCD: CPT | Performed by: FAMILY MEDICINE

## 2025-03-19 NOTE — PROGRESS NOTES
HPI:   Nanette Hennessy is a 54 year old female who presents for a complete physical exam.     Wt Readings from Last 6 Encounters:   03/19/25 142 lb (64.4 kg)   12/05/24 142 lb (64.4 kg)   11/12/24 142 lb 8 oz (64.6 kg)   03/13/24 143 lb (64.9 kg)   01/09/24 144 lb 3.2 oz (65.4 kg)   08/29/23 143 lb (64.9 kg)     Body mass index is 25.97 kg/m².     Cholesterol, Total (mg/dL)   Date Value   03/12/2025 207 (H)   03/12/2024 205 (H)   06/22/2023 190     HDL Cholesterol (mg/dL)   Date Value   03/12/2025 45   03/12/2024 48   06/22/2023 42     LDL Cholesterol (mg/dL)   Date Value   03/12/2025 138 (H)   03/12/2024 120 (H)   06/22/2023 103 (H)     AST (U/L)   Date Value   03/12/2025 23   12/16/2024 24   11/07/2024 24     ALT (U/L)   Date Value   03/12/2025 29   12/16/2024 30   11/07/2024 30       last pap- 2024  H/o abnormal HPV   No vaginal discharge  No bladder dysfunction  No menses  Some night sweats   birth control-mirena June 2017  + performing self breast exams  last mammogram- utd   dexa - never  c-scope - 2019   No calcium and vit D supplementation  No family history of breast colon or ovarian cancer    Seeing seeing emerita (CLL) javedal     Was having palpitations   Early family h/o CAD       Current Outpatient Medications   Medication Sig Dispense Refill    ROSUVASTATIN 5 MG Oral Tab TAKE 1 TABLET(5 MG) BY MOUTH EVERY NIGHT 90 tablet 0    fluticasone propionate 50 MCG/ACT Nasal Suspension 2 sprays by Nasal route nightly. (Patient not taking: Reported on 3/19/2025) 3 each 0    Multiple Vitamins-Minerals (MULTI-VITAMIN/MINERALS) Oral Tab Take 1 tablet by mouth daily.      Cholecalciferol (VITAMIN D3) 2000 units Oral Tab Take by mouth.        Past Medical History:    Brain cyst    CLL (chronic lymphocytic leukemia) (HCC)    Concussion    COVID-19    Heartburn    High cholesterol      Past Surgical History:   Procedure Laterality Date    Brain surgery  04/15/2015    LEFT PARIETAL CRANIOTOMY FOR TUMOR RESECTION  WITH  NEURONAVIGATION AND MICROSCOPE DISSECTION,     Breast biopsy Right 2017    Colonoscopy      Eye surgery  10/2013    Lasik    Needle biopsy right Right 2017    BENIGN FIBROADENOMA      Family History   Problem Relation Age of Onset    Heart Attack Father     Hypertension Father     Diabetes Father     Heart Disease Father     Other (CAD) Father     Other (CHF) Father     Breast Cancer Mother 52    High Cholesterol Mother     Colon Polyps Mother     Stroke Maternal Grandmother     Other (stroke) Maternal Grandmother     Heart Disease Maternal Grandfather     Ovarian Cancer Paternal Grandmother     Breast Cancer Paternal Grandmother 55        approx age    Heart Disease Paternal Grandfather     Hypertension Brother     Cancer Maternal Uncle 35        Colon Ca      Social History:   Social History     Socioeconomic History    Marital status:    Tobacco Use    Smoking status: Former     Current packs/day: 0.00     Average packs/day: 0.1 packs/day for 12.0 years (1.2 ttl pk-yrs)     Types: Cigarettes     Start date: 1987     Quit date: 1999     Years since quittin.2    Smokeless tobacco: Never   Substance and Sexual Activity    Alcohol use: Yes     Alcohol/week: 4.0 standard drinks of alcohol     Types: 4 Glasses of wine per week     Comment: 3-4x/month, 1-3 drinks    Drug use: No   Other Topics Concern    Caffeine Concern Yes     Comment: coffee, soda    Exercise Yes     Comment: couple days per week     Social Drivers of Health      [facility]    Housing Stability     Occ: . : . Children: 3  Works at [employer]   Exercise: 5 x per week   Diet: watches minimally     REVIEW OF SYSTEMS:   GENERAL: feels well otherwise  SKIN: denies any unusual skin lesions  EYES:denies blurred vision or double vision  HEENT: denies nasal congestion, sinus pain or ST  LUNGS: denies shortness of breath with exertion  CARDIOVASCULAR: denies chest pain on  exertion  GI: denies abdominal pain,denies heartburn  : denies dysuria, vaginal discharge or itching  MUSCULOSKELETAL: denies back pain  NEURO: denies headaches  PSYCHE: denies depression or anxiety  HEMATOLOGIC: denies hx of anemia  ENDOCRINE: denies thyroid history  ALL/ASTHMA: denies hx of allergy or asthma    EXAM:   /70   Pulse 85   Resp 16   Ht 5' 2\" (1.575 m)   Wt 142 lb (64.4 kg)   SpO2 98%   BMI 25.97 kg/m²   Body mass index is 25.97 kg/m².   GENERAL: alert and oriented X 3, well developed, well nourished,in no apparent distress  CARDIO: RRR without murmur  LUNGS: clear to auscultation  NECK: supple,no adenopathy,no thyromegaly  HEENT: atraumatic, normocephalic,ears and throat are clear  EYES:PERRLA, EOMI, normal,conjunctiva are clear  SKIN: norashes,no suspicious lesions  GI: good BS's,no masses, HSM or tenderness  CHEST: no chest tenderness  BREAST: no axillary LAD, no masses no nipple discharge bilaterally  ((: external genitalia - no inguinal LAD, no lesions.    Speculum exam- introitus is normal,scant discharge,cervix is pink, IUD string visualized  Bimanual exam- no adnexal masses or tenderness  RECTAL:good rectal tone,no mass, brown stool, stool is OB negative)) deferred   MUSCULOSKELETAL: back is not tender,FROM of the back  EXTREMITIES: no cyanosis, clubbing or edema  NEURO: cranial nerves are intact,motor and sensory are grossly intact    ASSESSMENT AND PLAN:   Nanette Hennessy is a 54 year old female who presents for a well woman exam.     1. Annual physical exam    - FSH [E]; Future  - Hemoglobin A1C; Future  - Lipid Panel; Future    2. Encounter for screening mammogram for high-risk patient    - [facility] ERENDIRA 2D+3D SCREENING BILAT (CPT=77067/75740); Future    3. Colon cancer screening  Will schedule     4. Palpitations    - Cardio Referral - Internal    5. Family history of early CAD    - Cardio Referral - Internal      Discussed diet, exercise,calcium, vitamin D, fish oil and  self breast exams.   Questions answered and patient indicates understanding of these issues and agrees to the plan.  Follow up in 1 year or sooner if needed.

## 2025-05-27 DIAGNOSIS — E78.00 ELEVATED CHOLESTEROL: ICD-10-CM

## 2025-05-27 DIAGNOSIS — R05.1 ACUTE COUGH: ICD-10-CM

## 2025-05-27 DIAGNOSIS — Z82.49 FAMILY HISTORY OF EARLY CAD: ICD-10-CM

## 2025-05-27 DIAGNOSIS — R09.81 NASAL CONGESTION: ICD-10-CM

## 2025-05-27 RX ORDER — ROSUVASTATIN CALCIUM 5 MG/1
5 TABLET, COATED ORAL NIGHTLY
Qty: 90 TABLET | Refills: 0 | Status: SHIPPED | OUTPATIENT
Start: 2025-05-27

## 2025-05-27 RX ORDER — ALBUTEROL SULFATE 90 UG/1
2 INHALANT RESPIRATORY (INHALATION) EVERY 6 HOURS PRN
Qty: 8.5 G | Refills: 0 | Status: SHIPPED | OUTPATIENT
Start: 2025-05-27

## 2025-05-27 NOTE — TELEPHONE ENCOUNTER
A refill request was received for:  Requested Prescriptions     Pending Prescriptions Disp Refills    ALBUTEROL 108 (90 Base) MCG/ACT Inhalation Aero Soln [Pharmacy Med Name: ALBUTEROL HFA INH (200 PUFFS) 8.5GM] 8.5 g 0     Sig: INHALE 2 PUFFS INTO THE LUNGS EVERY 6 HOURS AS NEEDED FOR WHEEZING       Last refill date:   1/23/24    Last office visit: 1/23/24 (Telemedicine)    Follow up due:  Future Appointments   Date Time Provider Department Center   7/23/2025 10:00 AM Eh Bonds MD [facility] SUB GI

## (undated) NOTE — MR AVS SNAPSHOT
[address]  [address] [address]  [phone]               Thank you for choosing us for your health care visit with Sj Soler DO.   We are glad to serve you and happy to provide you with this carballo night, and your sleep will not be restful. Before bedtime  To sleep better every night, try these tips:  · Have a bedtime routine to let your body and mind know when it’s time to sleep. · Think of going to bed as relaxing and enjoyable.  Sleep will come s thoughts fall away. · When you're done, stand up slowly and stretch your arms over your head. With practice, this exercise can help you feel restored.     Calm your body  With practice, you can use mental cues to tell your body how to feel.   · Sit comfort Assoc Dx:  Chronic left-sided low back pain without sciatica [M54.5, G89.29]                 Referral Details     Referred By    Referred To    Pio Zuleta DO   [address] [facility] [address]   Phone:  [phone]   Fax:  [phone]- You can access your MyChart to more actively manage your health care and view more details from this visit by going to [email or web address].   If you've recently had a stay at the Hospital you can access your discharge instructions in [address] by mary ellen Make half your plate fruits and vegetables Highly refined, white starches including white bread, rice and pasta   Eat plenty of protein, keep the fat content low Sugars:  sodas and sports drinks, candies and desserts   Eat plenty of low-fat dairy products

## (undated) NOTE — MR AVS SNAPSHOT
[facility], [place] [id number] [facility], [address] [place] [phone]               Thank you for choosing us for your health care visit with Harjinder Gamez MD.  We are glad to serve you and happy to provide you with this ? To best provide you care, patients receiving routine medications need to be seen at least once a year.  protocol for controlled substances:  Written prescriptions      ?  EFFECTIVE April 1, 2017 PATIENTS MUST  THEIR OWN NARCOTIC PRESCRIPT This list is accurate as of: 4/13/17  9:21 AM.  Always use your most recent med list.                MIRENA (52 MG) 20 MCG/24HR Iud   Generic drug:  Levonorgestrel   1 each by Intrauterine route once.                    MyChart     Visit MyChart  You can ac

## (undated) NOTE — LETTER
09/16/20 2025 [facility] [id number]      Dear Clinton Harmon,    [address] [facility] records indicate that you have outstanding lab work and or testing that was ordered for you and has not yet been completed:  Orders Placed This Encou

## (undated) NOTE — LETTER
Date: 7/12/2022    Patient Name: Davion Ybarra          To Whom it may concern: The above patient was seen at the [facility] for treatment of a medical condition. This patient should be excused from travel and hotel bookings due to medical condition she is not able to travel from 7/1/22 through 8/15/22.         Sincerely,    Dr. Laron Sharif, DO